# Patient Record
Sex: MALE | Race: WHITE | NOT HISPANIC OR LATINO | Employment: OTHER | URBAN - METROPOLITAN AREA
[De-identification: names, ages, dates, MRNs, and addresses within clinical notes are randomized per-mention and may not be internally consistent; named-entity substitution may affect disease eponyms.]

---

## 2017-04-17 PROBLEM — N52.9 ERECTILE DYSFUNCTION: Status: ACTIVE | Noted: 2017-04-17

## 2020-06-03 ENCOUNTER — TELEPHONE (OUTPATIENT)
Dept: FAMILY MEDICINE | Facility: CLINIC | Age: 70
End: 2020-06-03

## 2020-06-03 NOTE — TELEPHONE ENCOUNTER
----- Message from Zev Cheek sent at 6/3/2020  8:22 AM CDT -----  Contact: Pt  Pt called and would like to have someone call him back    Pt doctor is no longer with [facility] and he would like to see Dr. Goff. He would like to make sure that Dr. Goff accepts his insurance.    Pt can be reached at [phone]

## 2020-06-03 NOTE — TELEPHONE ENCOUNTER
Pt looking for a new pcp. Dr. Goff was recommended to pt, no appointments were available with dr goff in the time frame that pt needed. Scheduled pt with dr gomez to [facility].

## 2020-07-02 ENCOUNTER — PATIENT OUTREACH (OUTPATIENT)
Dept: ADMINISTRATIVE | Facility: HOSPITAL | Age: 70
End: 2020-07-02

## 2020-07-02 ENCOUNTER — LAB VISIT (OUTPATIENT)
Dept: LAB | Facility: HOSPITAL | Age: 70
End: 2020-07-02
Attending: FAMILY MEDICINE
Payer: MEDICARE

## 2020-07-02 ENCOUNTER — OFFICE VISIT (OUTPATIENT)
Dept: FAMILY MEDICINE | Facility: CLINIC | Age: 70
End: 2020-07-02
Payer: MEDICARE

## 2020-07-02 VITALS
WEIGHT: 250.25 LBS | OXYGEN SATURATION: 98 % | TEMPERATURE: 98 F | BODY MASS INDEX: 39.28 KG/M2 | SYSTOLIC BLOOD PRESSURE: 128 MMHG | HEART RATE: 83 BPM | DIASTOLIC BLOOD PRESSURE: 78 MMHG | HEIGHT: 67 IN

## 2020-07-02 DIAGNOSIS — E11.69 TYPE 2 DIABETES MELLITUS WITH OTHER SPECIFIED COMPLICATION, UNSPECIFIED WHETHER LONG TERM INSULIN USE: ICD-10-CM

## 2020-07-02 DIAGNOSIS — K21.9 GASTROESOPHAGEAL REFLUX DISEASE, ESOPHAGITIS PRESENCE NOT SPECIFIED: ICD-10-CM

## 2020-07-02 DIAGNOSIS — N52.9 ERECTILE DYSFUNCTION, UNSPECIFIED ERECTILE DYSFUNCTION TYPE: ICD-10-CM

## 2020-07-02 DIAGNOSIS — N40.0 BENIGN PROSTATIC HYPERPLASIA, UNSPECIFIED WHETHER LOWER URINARY TRACT SYMPTOMS PRESENT: ICD-10-CM

## 2020-07-02 DIAGNOSIS — N40.0 BENIGN PROSTATIC HYPERPLASIA, UNSPECIFIED WHETHER LOWER URINARY TRACT SYMPTOMS PRESENT: Primary | ICD-10-CM

## 2020-07-02 PROBLEM — N52.01 ERECTILE DYSFUNCTION DUE TO ARTERIAL INSUFFICIENCY: Status: ACTIVE | Noted: 2017-01-31

## 2020-07-02 LAB
ALBUMIN SERPL BCP-MCNC: 4 G/DL (ref 3.5–5.2)
ALP SERPL-CCNC: 83 U/L (ref 55–135)
ALT SERPL W/O P-5'-P-CCNC: 17 U/L (ref 10–44)
ANION GAP SERPL CALC-SCNC: 8 MMOL/L (ref 8–16)
AST SERPL-CCNC: 18 U/L (ref 10–40)
BASOPHILS # BLD AUTO: 0.02 K/UL (ref 0–0.2)
BASOPHILS NFR BLD: 0.3 % (ref 0–1.9)
BILIRUB SERPL-MCNC: 0.5 MG/DL (ref 0.1–1)
BUN SERPL-MCNC: 24 MG/DL (ref 8–23)
CALCIUM SERPL-MCNC: 9.2 MG/DL (ref 8.7–10.5)
CHLORIDE SERPL-SCNC: 108 MMOL/L (ref 95–110)
CHOLEST SERPL-MCNC: 140 MG/DL (ref 120–199)
CHOLEST/HDLC SERPL: 3 {RATIO} (ref 2–5)
CO2 SERPL-SCNC: 26 MMOL/L (ref 23–29)
CREAT SERPL-MCNC: 0.9 MG/DL (ref 0.5–1.4)
DIFFERENTIAL METHOD: ABNORMAL
EOSINOPHIL # BLD AUTO: 0 K/UL (ref 0–0.5)
EOSINOPHIL NFR BLD: 0.7 % (ref 0–8)
ERYTHROCYTE [DISTWIDTH] IN BLOOD BY AUTOMATED COUNT: 13.7 % (ref 11.5–14.5)
EST. GFR  (AFRICAN AMERICAN): >60 ML/MIN/1.73 M^2
EST. GFR  (NON AFRICAN AMERICAN): >60 ML/MIN/1.73 M^2
GLUCOSE SERPL-MCNC: 125 MG/DL (ref 70–110)
HCT VFR BLD AUTO: 43.2 % (ref 40–54)
HDLC SERPL-MCNC: 47 MG/DL (ref 40–75)
HDLC SERPL: 33.6 % (ref 20–50)
HGB BLD-MCNC: 14.2 G/DL (ref 14–18)
IMM GRANULOCYTES # BLD AUTO: 0.02 K/UL (ref 0–0.04)
IMM GRANULOCYTES NFR BLD AUTO: 0.3 % (ref 0–0.5)
LDLC SERPL CALC-MCNC: 75.4 MG/DL (ref 63–159)
LYMPHOCYTES # BLD AUTO: 1.6 K/UL (ref 1–4.8)
LYMPHOCYTES NFR BLD: 27.3 % (ref 18–48)
MCH RBC QN AUTO: 29.6 PG (ref 27–31)
MCHC RBC AUTO-ENTMCNC: 32.9 G/DL (ref 32–36)
MCV RBC AUTO: 90 FL (ref 82–98)
MONOCYTES # BLD AUTO: 0.5 K/UL (ref 0.3–1)
MONOCYTES NFR BLD: 9 % (ref 4–15)
NEUTROPHILS # BLD AUTO: 3.6 K/UL (ref 1.8–7.7)
NEUTROPHILS NFR BLD: 62.4 % (ref 38–73)
NONHDLC SERPL-MCNC: 93 MG/DL
NRBC BLD-RTO: 0 /100 WBC
PLATELET # BLD AUTO: 168 K/UL (ref 150–350)
PMV BLD AUTO: 9.1 FL (ref 9.2–12.9)
POTASSIUM SERPL-SCNC: 5 MMOL/L (ref 3.5–5.1)
PROSTATE SPECIFIC ANTIGEN, TOTAL: 1.5 NG/ML (ref 0–4)
PROT SERPL-MCNC: 6.7 G/DL (ref 6–8.4)
PSA FREE MFR SERPL: 46 %
PSA FREE SERPL-MCNC: 0.69 NG/ML (ref 0.01–1.5)
RBC # BLD AUTO: 4.79 M/UL (ref 4.6–6.2)
SODIUM SERPL-SCNC: 142 MMOL/L (ref 136–145)
TRIGL SERPL-MCNC: 88 MG/DL (ref 30–150)
TSH SERPL DL<=0.005 MIU/L-ACNC: 0.82 UIU/ML (ref 0.4–4)
WBC # BLD AUTO: 5.78 K/UL (ref 3.9–12.7)

## 2020-07-02 PROCEDURE — 80061 LIPID PANEL: CPT

## 2020-07-02 PROCEDURE — 99203 PR OFFICE/OUTPT VISIT, NEW, LEVL III, 30-44 MIN: ICD-10-PCS | Mod: S$PBB,,, | Performed by: FAMILY MEDICINE

## 2020-07-02 PROCEDURE — 99203 OFFICE O/P NEW LOW 30 MIN: CPT | Mod: S$PBB,,, | Performed by: FAMILY MEDICINE

## 2020-07-02 PROCEDURE — 83036 HEMOGLOBIN GLYCOSYLATED A1C: CPT

## 2020-07-02 PROCEDURE — 85025 COMPLETE CBC W/AUTO DIFF WBC: CPT

## 2020-07-02 PROCEDURE — 99999 PR PBB SHADOW E&M-EST. PATIENT-LVL IV: CPT | Mod: PBBFAC,,, | Performed by: FAMILY MEDICINE

## 2020-07-02 PROCEDURE — 99999 PR PBB SHADOW E&M-EST. PATIENT-LVL IV: ICD-10-PCS | Mod: PBBFAC,,, | Performed by: FAMILY MEDICINE

## 2020-07-02 PROCEDURE — 86803 HEPATITIS C AB TEST: CPT

## 2020-07-02 PROCEDURE — 36415 COLL VENOUS BLD VENIPUNCTURE: CPT

## 2020-07-02 PROCEDURE — 84154 ASSAY OF PSA FREE: CPT

## 2020-07-02 PROCEDURE — 99214 OFFICE O/P EST MOD 30 MIN: CPT | Mod: PBBFAC,PO | Performed by: FAMILY MEDICINE

## 2020-07-02 PROCEDURE — 80053 COMPREHEN METABOLIC PANEL: CPT

## 2020-07-02 PROCEDURE — 84443 ASSAY THYROID STIM HORMONE: CPT

## 2020-07-02 RX ORDER — AMITRIPTYLINE HYDROCHLORIDE 25 MG/1
25 TABLET, FILM COATED ORAL NIGHTLY PRN
COMMUNITY
End: 2022-06-15

## 2020-07-02 RX ORDER — ATORVASTATIN CALCIUM 10 MG/1
10 TABLET, FILM COATED ORAL
COMMUNITY
End: 2021-01-08 | Stop reason: SDUPTHER

## 2020-07-02 RX ORDER — SILDENAFIL 100 MG/1
TABLET, FILM COATED ORAL
COMMUNITY
End: 2022-06-15

## 2020-07-02 RX ORDER — CYANOCOBALAMIN (VITAMIN B-12) 500 MCG
1 TABLET ORAL
COMMUNITY

## 2020-07-02 RX ORDER — MELOXICAM 15 MG/1
15 TABLET ORAL
COMMUNITY
End: 2021-01-08 | Stop reason: SDUPTHER

## 2020-07-02 RX ORDER — PSYLLIUM HUSK 0.4 G
CAPSULE ORAL
COMMUNITY

## 2020-07-02 RX ORDER — MINERAL OIL
180 ENEMA (ML) RECTAL
COMMUNITY

## 2020-07-02 RX ORDER — HYDROGEN PEROXIDE 3 %
40 SOLUTION, NON-ORAL MISCELLANEOUS
COMMUNITY
End: 2023-09-14 | Stop reason: SDUPTHER

## 2020-07-02 RX ORDER — TRAMADOL HYDROCHLORIDE 50 MG/1
50 TABLET ORAL EVERY 6 HOURS PRN
COMMUNITY

## 2020-07-02 RX ORDER — NAPROXEN SODIUM 220 MG
220 TABLET ORAL 2 TIMES DAILY WITH MEALS
COMMUNITY
End: 2022-06-15

## 2020-07-02 RX ORDER — METFORMIN HYDROCHLORIDE 500 MG/1
TABLET ORAL
COMMUNITY
Start: 2020-06-26 | End: 2021-01-08 | Stop reason: SDUPTHER

## 2020-07-02 RX ORDER — METFORMIN HYDROCHLORIDE 500 MG/1
500 TABLET ORAL 2 TIMES DAILY WITH MEALS
COMMUNITY
End: 2021-01-08 | Stop reason: SDUPTHER

## 2020-07-02 RX ORDER — DIAPER,BRIEF,ADULT, DISPOSABLE
500 EACH MISCELLANEOUS
COMMUNITY

## 2020-07-02 RX ORDER — ALBUTEROL SULFATE 90 UG/1
2 AEROSOL, METERED RESPIRATORY (INHALATION) EVERY 6 HOURS PRN
COMMUNITY
End: 2023-05-23 | Stop reason: ALTCHOICE

## 2020-07-02 NOTE — PROGRESS NOTES
Chart review completed 2020.  Care Everywhere updates requested and reviewed.  Immunizations reconciled. Media reports reviewed.  Duplicate HM overrides and  orders removed.  Overdue HM topic chart audit and/or requested.  Overdue lab testing linked to upcoming lab appointments if applies.    HM updated with external COLONOSCOPY report.     LABS SCHEDULED.     Health Maintenance Due   Topic Date Due    PROSTATE-SPECIFIC ANTIGEN  1950    Hepatitis C Screening  1950    Shingles Vaccine (1 of 2) 2001

## 2020-07-02 NOTE — PATIENT INSTRUCTIONS
Metformin extended-release tablets  What is this medicine?  METFORMIN (met FOR min) is used to treat type 2 diabetes. It helps to control blood sugar. Treatment is combined with diet and exercise. This medicine can be used alone or with other medicines for diabetes.  How should I use this medicine?  Take this medicine by mouth with a glass of water. Take it with meals. Swallow whole, do not crush or chew. Follow the directions on the prescription label. Take your medicine at regular intervals. Do not take your medicine more often than directed.  Talk to your pediatrician regarding the use of this medicine in children. Special care may be needed.  What side effects may I notice from receiving this medicine?  Side effects that you should report to your doctor or health care professional as soon as possible:  · allergic reactions like skin rash, itching or hives, swelling of the face, lips, or tongue  · breathing problems  · feeling faint or lightheaded, falls  · muscle aches or pains  · signs and symptoms of low blood sugar such as feeling anxious, confusion, dizziness, increased hunger, unusually weak or tired, sweating, shakiness, cold, irritable, headache, blurred vision, fast heartbeat, loss of consciousness  · slow or irregular heartbeat  · unusual stomach pain or discomfort  · unusually tired or weak  Side effects that usually do not require medical attention (report to your doctor or health care professional if they continue or are bothersome):  · diarrhea  · headache  · heartburn  · metallic taste in mouth  · nausea  · stomach gas, upset  What may interact with this medicine?  Do not take this medicine with any of the following medications:  · dofetilide  · gatifloxacin  · certain contrast medicines given before X-rays, CT scans, MRI, or other procedures  This medicine may also interact with the following medications:  · acetazolamide  · certain medicines for HIV infection or hepatitis, like adefovir,  emtricitabine, entecavir, lamivudine, or tenofovir  · cimetidine  · crizotinib  · digoxin  · diuretics  · female hormones, like estrogens or progestins and birth control pills  · glycopyrrolate  · isoniazid  · lamotrigine  · medicines for blood pressure, heart disease, irregular heart beat  · memantine  · midodrine  · methazolamide  · morphine  · nicotinic acid  · phenothiazines like chlorpromazine, mesoridazine, prochlorperazine, thioridazine  · phenytoin  · procainamide  · propantheline  · quinidine  · quinine  · ranitidine  · ranolazine  · steroid medicines like prednisone or cortisone  · stimulant medicines for attention disorders, weight loss, or to stay awake  · thyroid medicines  · topiramate  · trimethoprim  · trospium  · vancomycin  · vandetanib  · zonisamide  What if I miss a dose?  If you miss a dose, take it as soon as you can. If it is almost time for your next dose, take only that dose. Do not take double or extra doses.  Where should I keep my medicine?  Keep out of the reach of children.  Store at room temperature between 15 and 30 degrees C (59 and 86 degrees F). Protect from light. Throw away any unused medicine after the expiration date.  What should I tell my health care provider before I take this medicine?  They need to know if you have any of these conditions:  · anemia  · frequently drink alcohol-containing beverages  · become easily dehydrated  · heart attack  · heart failure  · kidney disease  · liver disease  · polycystic ovary syndrome  · serious infection or injury  · vomiting  · an unusual or allergic reaction to metformin, other medicines, foods, dyes, or preservatives  · pregnant or trying to get pregnant  · breast-feeding  What should I watch for while using this medicine?  Visit your doctor or health care professional for regular checks on your progress.  A test called the HbA1C (A1C) will be monitored. This is a simple blood test. It measures your blood sugar control over the last  2 to 3 months. You will receive this test every 3 to 6 months.  Learn how to check your blood sugar. Learn the symptoms of low and high blood sugar and how to manage them.  Always carry a quick-source of sugar with you in case you have symptoms of low blood sugar. Examples include hard sugar candy or glucose tablets. Make sure others know that you can choke if you eat or drink when you develop serious symptoms of low blood sugar, such as seizures or unconsciousness. They must get medical help at once.  Tell your doctor or health care professional if you have high blood sugar. You might need to change the dose of your medicine. If you are sick or exercising more than usual, you might need to change the dose of your medicine.  Do not skip meals. Ask your doctor or health care professional if you should avoid alcohol. Many nonprescription cough and cold products contain sugar or alcohol. These can affect blood sugar.  This medicine may cause ovulation in premenopausal women who do not have regular monthly periods. This may increase your chances of becoming pregnant. You should not take this medicine if you become pregnant or think you may be pregnant. Talk with your doctor or health care professional about your birth control options while taking this medicine. Contact your doctor or health care professional right away if think you are pregnant.  The tablet shell for some brands of this medicine does not dissolve. This is normal. The tablet shell may appear whole in the stool. This is not a cause for concern.  If you are going to need surgery, a MRI, CT scan, or other procedure, tell your doctor that you are taking this medicine. You may need to stop taking this medicine before the procedure.  Wear a medical ID bracelet or chain, and carry a card that describes your disease and details of your medicine and dosage times.  NOTE:This sheet is a summary. It may not cover all possible information. If you have questions about  this medicine, talk to your doctor, pharmacist, or health care provider. Copyright© 2017 [employer]

## 2020-07-02 NOTE — PROGRESS NOTES
Subjective:       Patient ID: Cali Ho is a 70 y.o. male.    Chief Complaint: Establish Care    Patient here today to establish care.  He lives in [place], [place] and his previous PCP was in [facility].  Past medical history includes hypertension, erectile dysfunction, GERD, and diabetes.  He has no complaints or concerns today.  He had a colonoscopy done this year in [facility].  He also believes he has had the pneumonia vaccine although he is not sure when.    Review of Systems   Constitutional: Negative for activity change, appetite change, chills and fever.   HENT: Negative for nasal congestion, ear discharge, ear pain and sinus pressure/congestion.    Eyes: Negative for pain and itching.   Respiratory: Negative for chest tightness and shortness of breath.    Cardiovascular: Negative for chest pain and palpitations.   Gastrointestinal: Negative for abdominal pain, constipation, nausea and vomiting.   Endocrine: Negative for cold intolerance and heat intolerance.   Musculoskeletal: Negative for arthralgias, joint swelling and myalgias.   Integumentary:  Negative for color change and rash.   Neurological: Negative for dizziness, weakness and headaches.   Psychiatric/Behavioral: Negative for agitation, behavioral problems and confusion.         Objective:      Physical Exam  Vitals signs and nursing note reviewed.   Constitutional:       Appearance: He is well-developed.   HENT:      Head: Normocephalic and atraumatic.   Eyes:      Pupils: Pupils are equal, round, and reactive to light.   Neck:      Musculoskeletal: Normal range of motion and neck supple.   Cardiovascular:      Rate and Rhythm: Normal rate and regular rhythm.      Heart sounds: No murmur.   Pulmonary:      Effort: Pulmonary effort is normal. No respiratory distress.      Breath sounds: Normal breath sounds. No wheezing or rales.   Abdominal:      General: There is no distension.      Palpations: Abdomen is soft.      Tenderness: There  is no abdominal tenderness. There is no guarding.   Musculoskeletal: Normal range of motion.   Skin:     General: Skin is warm and dry.   Neurological:      Mental Status: He is alert and oriented to person, place, and time.      Deep Tendon Reflexes: Reflexes normal.   Psychiatric:         Behavior: Behavior normal.         Thought Content: Thought content normal.         Judgment: Judgment normal.         Assessment:       1. Benign prostatic hyperplasia, unspecified whether lower urinary tract symptoms present    2. Erectile dysfunction, unspecified erectile dysfunction type    3. Type 2 diabetes mellitus with other specified complication, unspecified whether long term insulin use    4. Gastroesophageal reflux disease, esophagitis presence not specified        Plan:       Orders Placed This Encounter   Procedures    CBC auto differential     Standing Status:   Future     Standing Expiration Date:   8/2/2020    Comprehensive metabolic panel     Standing Status:   Future     Standing Expiration Date:   8/2/2020    Hemoglobin A1C     Standing Status:   Future     Standing Expiration Date:   8/2/2020    Lipid Panel     Standing Status:   Future     Standing Expiration Date:   8/2/2020    PSA, total and free     Standing Status:   Future     Standing Expiration Date:   8/2/2020    TSH     Standing Status:   Future     Standing Expiration Date:   8/2/2020    Hepatitis C Antibody     Standing Status:   Future     Standing Expiration Date:   9/2/2021     -order fasting labs as above  -will need old records from previous providers to assess health maintenance needs.

## 2020-07-03 LAB
ESTIMATED AVG GLUCOSE: 128 MG/DL (ref 68–131)
HBA1C MFR BLD HPLC: 6.1 % (ref 4–5.6)
HCV AB SERPL QL IA: NEGATIVE

## 2021-01-08 ENCOUNTER — OFFICE VISIT (OUTPATIENT)
Dept: FAMILY MEDICINE | Facility: CLINIC | Age: 71
End: 2021-01-08
Payer: MEDICARE

## 2021-01-08 ENCOUNTER — LAB VISIT (OUTPATIENT)
Dept: LAB | Facility: HOSPITAL | Age: 71
End: 2021-01-08
Attending: NURSE PRACTITIONER
Payer: MEDICARE

## 2021-01-08 VITALS
HEART RATE: 84 BPM | TEMPERATURE: 98 F | BODY MASS INDEX: 39.48 KG/M2 | DIASTOLIC BLOOD PRESSURE: 86 MMHG | WEIGHT: 251.56 LBS | SYSTOLIC BLOOD PRESSURE: 142 MMHG | HEIGHT: 67 IN

## 2021-01-08 DIAGNOSIS — E11.69 TYPE 2 DIABETES MELLITUS WITH OTHER SPECIFIED COMPLICATION, UNSPECIFIED WHETHER LONG TERM INSULIN USE: ICD-10-CM

## 2021-01-08 DIAGNOSIS — N40.0 BENIGN PROSTATIC HYPERPLASIA, UNSPECIFIED WHETHER LOWER URINARY TRACT SYMPTOMS PRESENT: ICD-10-CM

## 2021-01-08 DIAGNOSIS — E11.69 TYPE 2 DIABETES MELLITUS WITH OTHER SPECIFIED COMPLICATION, UNSPECIFIED WHETHER LONG TERM INSULIN USE: Primary | ICD-10-CM

## 2021-01-08 LAB
ALBUMIN SERPL BCP-MCNC: 3.9 G/DL (ref 3.5–5.2)
ALP SERPL-CCNC: 84 U/L (ref 55–135)
ALT SERPL W/O P-5'-P-CCNC: 20 U/L (ref 10–44)
ANION GAP SERPL CALC-SCNC: 9 MMOL/L (ref 8–16)
AST SERPL-CCNC: 24 U/L (ref 10–40)
BILIRUB SERPL-MCNC: 0.6 MG/DL (ref 0.1–1)
BUN SERPL-MCNC: 19 MG/DL (ref 8–23)
CALCIUM SERPL-MCNC: 8.8 MG/DL (ref 8.7–10.5)
CHLORIDE SERPL-SCNC: 107 MMOL/L (ref 95–110)
CO2 SERPL-SCNC: 23 MMOL/L (ref 23–29)
CREAT SERPL-MCNC: 0.9 MG/DL (ref 0.5–1.4)
EST. GFR  (AFRICAN AMERICAN): >60 ML/MIN/1.73 M^2
EST. GFR  (NON AFRICAN AMERICAN): >60 ML/MIN/1.73 M^2
ESTIMATED AVG GLUCOSE: 134 MG/DL (ref 68–131)
GLUCOSE SERPL-MCNC: 116 MG/DL (ref 70–110)
HBA1C MFR BLD HPLC: 6.3 % (ref 4–5.6)
POTASSIUM SERPL-SCNC: 4.4 MMOL/L (ref 3.5–5.1)
PROT SERPL-MCNC: 6.7 G/DL (ref 6–8.4)
SODIUM SERPL-SCNC: 139 MMOL/L (ref 136–145)

## 2021-01-08 PROCEDURE — 36415 COLL VENOUS BLD VENIPUNCTURE: CPT | Mod: PO

## 2021-01-08 PROCEDURE — 99999 PR PBB SHADOW E&M-EST. PATIENT-LVL IV: ICD-10-PCS | Mod: PBBFAC,,, | Performed by: NURSE PRACTITIONER

## 2021-01-08 PROCEDURE — 99214 PR OFFICE/OUTPT VISIT, EST, LEVL IV, 30-39 MIN: ICD-10-PCS | Mod: S$PBB,,, | Performed by: NURSE PRACTITIONER

## 2021-01-08 PROCEDURE — 99214 OFFICE O/P EST MOD 30 MIN: CPT | Mod: PBBFAC,PO | Performed by: NURSE PRACTITIONER

## 2021-01-08 PROCEDURE — 99999 PR PBB SHADOW E&M-EST. PATIENT-LVL IV: CPT | Mod: PBBFAC,,, | Performed by: NURSE PRACTITIONER

## 2021-01-08 PROCEDURE — 99214 OFFICE O/P EST MOD 30 MIN: CPT | Mod: S$PBB,,, | Performed by: NURSE PRACTITIONER

## 2021-01-08 PROCEDURE — 80053 COMPREHEN METABOLIC PANEL: CPT

## 2021-01-08 PROCEDURE — 83036 HEMOGLOBIN GLYCOSYLATED A1C: CPT

## 2021-01-08 RX ORDER — ATORVASTATIN CALCIUM 10 MG/1
10 TABLET, FILM COATED ORAL NIGHTLY
Qty: 90 TABLET | Refills: 1 | Status: SHIPPED | OUTPATIENT
Start: 2021-01-08 | End: 2021-06-29

## 2021-01-08 RX ORDER — MELOXICAM 15 MG/1
15 TABLET ORAL DAILY PRN
Qty: 90 TABLET | Refills: 1 | Status: SHIPPED | OUTPATIENT
Start: 2021-01-08 | End: 2021-06-29

## 2021-01-08 RX ORDER — METFORMIN HYDROCHLORIDE 500 MG/1
500 TABLET ORAL 2 TIMES DAILY WITH MEALS
Qty: 180 TABLET | Refills: 1 | Status: SHIPPED | OUTPATIENT
Start: 2021-01-08 | End: 2021-06-29

## 2021-01-14 DIAGNOSIS — E11.9 TYPE 2 DIABETES MELLITUS WITHOUT COMPLICATION: ICD-10-CM

## 2021-04-05 ENCOUNTER — PATIENT MESSAGE (OUTPATIENT)
Dept: ADMINISTRATIVE | Facility: HOSPITAL | Age: 71
End: 2021-04-05

## 2021-06-10 ENCOUNTER — PATIENT OUTREACH (OUTPATIENT)
Dept: ADMINISTRATIVE | Facility: HOSPITAL | Age: 71
End: 2021-06-10

## 2021-06-10 DIAGNOSIS — E11.9 DIABETES MELLITUS WITHOUT COMPLICATION: Primary | ICD-10-CM

## 2021-06-11 ENCOUNTER — TELEPHONE (OUTPATIENT)
Dept: FAMILY MEDICINE | Facility: CLINIC | Age: 71
End: 2021-06-11

## 2021-06-29 RX ORDER — METFORMIN HYDROCHLORIDE 500 MG/1
TABLET ORAL
Qty: 180 TABLET | Refills: 1 | Status: SHIPPED | OUTPATIENT
Start: 2021-06-29 | End: 2022-01-25 | Stop reason: SDUPTHER

## 2021-06-29 RX ORDER — ATORVASTATIN CALCIUM 10 MG/1
TABLET, FILM COATED ORAL
Qty: 90 TABLET | Refills: 1 | Status: SHIPPED | OUTPATIENT
Start: 2021-06-29 | End: 2022-01-25 | Stop reason: SDUPTHER

## 2021-06-29 RX ORDER — MELOXICAM 15 MG/1
TABLET ORAL
Qty: 90 TABLET | Refills: 1 | Status: SHIPPED | OUTPATIENT
Start: 2021-06-29 | End: 2022-01-25 | Stop reason: SDUPTHER

## 2021-07-12 ENCOUNTER — LAB VISIT (OUTPATIENT)
Dept: LAB | Facility: HOSPITAL | Age: 71
End: 2021-07-12
Payer: MEDICARE

## 2021-07-12 ENCOUNTER — CLINICAL SUPPORT (OUTPATIENT)
Dept: FAMILY MEDICINE | Facility: CLINIC | Age: 71
End: 2021-07-12
Attending: FAMILY MEDICINE
Payer: MEDICARE

## 2021-07-12 ENCOUNTER — OFFICE VISIT (OUTPATIENT)
Dept: FAMILY MEDICINE | Facility: CLINIC | Age: 71
End: 2021-07-12
Payer: MEDICARE

## 2021-07-12 VITALS
BODY MASS INDEX: 39.62 KG/M2 | DIASTOLIC BLOOD PRESSURE: 76 MMHG | OXYGEN SATURATION: 95 % | HEIGHT: 67 IN | HEART RATE: 80 BPM | SYSTOLIC BLOOD PRESSURE: 126 MMHG | WEIGHT: 252.44 LBS

## 2021-07-12 DIAGNOSIS — E11.69 TYPE 2 DIABETES MELLITUS WITH OTHER SPECIFIED COMPLICATION, UNSPECIFIED WHETHER LONG TERM INSULIN USE: ICD-10-CM

## 2021-07-12 DIAGNOSIS — N52.01 ERECTILE DYSFUNCTION DUE TO ARTERIAL INSUFFICIENCY: ICD-10-CM

## 2021-07-12 DIAGNOSIS — E11.69 TYPE 2 DIABETES MELLITUS WITH OTHER SPECIFIED COMPLICATION, UNSPECIFIED WHETHER LONG TERM INSULIN USE: Primary | ICD-10-CM

## 2021-07-12 DIAGNOSIS — N40.0 BENIGN PROSTATIC HYPERPLASIA, UNSPECIFIED WHETHER LOWER URINARY TRACT SYMPTOMS PRESENT: ICD-10-CM

## 2021-07-12 DIAGNOSIS — E11.9 DIABETES MELLITUS WITHOUT COMPLICATION: ICD-10-CM

## 2021-07-12 LAB
ALBUMIN SERPL BCP-MCNC: 3.9 G/DL (ref 3.5–5.2)
ALP SERPL-CCNC: 83 U/L (ref 55–135)
ALT SERPL W/O P-5'-P-CCNC: 18 U/L (ref 10–44)
ANION GAP SERPL CALC-SCNC: 8 MMOL/L (ref 8–16)
AST SERPL-CCNC: 22 U/L (ref 10–40)
BASOPHILS # BLD AUTO: 0.01 K/UL (ref 0–0.2)
BASOPHILS NFR BLD: 0.2 % (ref 0–1.9)
BILIRUB SERPL-MCNC: 0.7 MG/DL (ref 0.1–1)
BUN SERPL-MCNC: 15 MG/DL (ref 8–23)
CALCIUM SERPL-MCNC: 9.1 MG/DL (ref 8.7–10.5)
CHLORIDE SERPL-SCNC: 108 MMOL/L (ref 95–110)
CHOLEST SERPL-MCNC: 152 MG/DL (ref 120–199)
CHOLEST/HDLC SERPL: 3 {RATIO} (ref 2–5)
CO2 SERPL-SCNC: 22 MMOL/L (ref 23–29)
CREAT SERPL-MCNC: 0.9 MG/DL (ref 0.5–1.4)
DIFFERENTIAL METHOD: ABNORMAL
EOSINOPHIL # BLD AUTO: 0.1 K/UL (ref 0–0.5)
EOSINOPHIL NFR BLD: 0.8 % (ref 0–8)
ERYTHROCYTE [DISTWIDTH] IN BLOOD BY AUTOMATED COUNT: 14 % (ref 11.5–14.5)
EST. GFR  (AFRICAN AMERICAN): >60 ML/MIN/1.73 M^2
EST. GFR  (NON AFRICAN AMERICAN): >60 ML/MIN/1.73 M^2
ESTIMATED AVG GLUCOSE: 134 MG/DL (ref 68–131)
GLUCOSE SERPL-MCNC: 133 MG/DL (ref 70–110)
HBA1C MFR BLD: 6.3 % (ref 4–5.6)
HCT VFR BLD AUTO: 41.9 % (ref 40–54)
HDLC SERPL-MCNC: 50 MG/DL (ref 40–75)
HDLC SERPL: 32.9 % (ref 20–50)
HGB BLD-MCNC: 14.3 G/DL (ref 14–18)
IMM GRANULOCYTES # BLD AUTO: 0.02 K/UL (ref 0–0.04)
IMM GRANULOCYTES NFR BLD AUTO: 0.3 % (ref 0–0.5)
LDLC SERPL CALC-MCNC: 69 MG/DL (ref 63–159)
LYMPHOCYTES # BLD AUTO: 1.6 K/UL (ref 1–4.8)
LYMPHOCYTES NFR BLD: 26.9 % (ref 18–48)
MCH RBC QN AUTO: 30.2 PG (ref 27–31)
MCHC RBC AUTO-ENTMCNC: 34.1 G/DL (ref 32–36)
MCV RBC AUTO: 88 FL (ref 82–98)
MONOCYTES # BLD AUTO: 0.5 K/UL (ref 0.3–1)
MONOCYTES NFR BLD: 8.9 % (ref 4–15)
NEUTROPHILS # BLD AUTO: 3.8 K/UL (ref 1.8–7.7)
NEUTROPHILS NFR BLD: 62.9 % (ref 38–73)
NONHDLC SERPL-MCNC: 102 MG/DL
NRBC BLD-RTO: 0 /100 WBC
PLATELET # BLD AUTO: 148 K/UL (ref 150–450)
PMV BLD AUTO: 9.9 FL (ref 9.2–12.9)
POTASSIUM SERPL-SCNC: 4.3 MMOL/L (ref 3.5–5.1)
PROT SERPL-MCNC: 6.5 G/DL (ref 6–8.4)
RBC # BLD AUTO: 4.74 M/UL (ref 4.6–6.2)
SODIUM SERPL-SCNC: 138 MMOL/L (ref 136–145)
TRIGL SERPL-MCNC: 165 MG/DL (ref 30–150)
WBC # BLD AUTO: 6.1 K/UL (ref 3.9–12.7)

## 2021-07-12 PROCEDURE — 36415 COLL VENOUS BLD VENIPUNCTURE: CPT | Mod: PO | Performed by: FAMILY MEDICINE

## 2021-07-12 PROCEDURE — 85025 COMPLETE CBC W/AUTO DIFF WBC: CPT | Performed by: FAMILY MEDICINE

## 2021-07-12 PROCEDURE — 99999 PR PBB SHADOW E&M-EST. PATIENT-LVL V: CPT | Mod: PBBFAC,,, | Performed by: FAMILY MEDICINE

## 2021-07-12 PROCEDURE — 92228 IMG RTA DETC/MNTR DS PHY/QHP: CPT | Mod: PBBFAC,PO

## 2021-07-12 PROCEDURE — 83036 HEMOGLOBIN GLYCOSYLATED A1C: CPT | Performed by: FAMILY MEDICINE

## 2021-07-12 PROCEDURE — 92228 DIABETIC EYE SCREENING PHOTO: ICD-10-PCS | Mod: 26,S$PBB,, | Performed by: OPTOMETRIST

## 2021-07-12 PROCEDURE — 84154 ASSAY OF PSA FREE: CPT | Performed by: FAMILY MEDICINE

## 2021-07-12 PROCEDURE — 99215 OFFICE O/P EST HI 40 MIN: CPT | Mod: PBBFAC,PO | Performed by: FAMILY MEDICINE

## 2021-07-12 PROCEDURE — 84153 ASSAY OF PSA TOTAL: CPT | Performed by: FAMILY MEDICINE

## 2021-07-12 PROCEDURE — 80061 LIPID PANEL: CPT | Performed by: FAMILY MEDICINE

## 2021-07-12 PROCEDURE — 99213 OFFICE O/P EST LOW 20 MIN: CPT | Mod: S$PBB,,, | Performed by: FAMILY MEDICINE

## 2021-07-12 PROCEDURE — 80053 COMPREHEN METABOLIC PANEL: CPT | Performed by: FAMILY MEDICINE

## 2021-07-12 PROCEDURE — 92228 IMG RTA DETC/MNTR DS PHY/QHP: CPT | Mod: 26,S$PBB,, | Performed by: OPTOMETRIST

## 2021-07-12 PROCEDURE — 90471 IMMUNIZATION ADMIN: CPT | Mod: PBBFAC,PO

## 2021-07-12 PROCEDURE — 99999 PR PBB SHADOW E&M-EST. PATIENT-LVL V: ICD-10-PCS | Mod: PBBFAC,,, | Performed by: FAMILY MEDICINE

## 2021-07-12 PROCEDURE — 99213 PR OFFICE/OUTPT VISIT, EST, LEVL III, 20-29 MIN: ICD-10-PCS | Mod: S$PBB,,, | Performed by: FAMILY MEDICINE

## 2021-07-13 LAB
PROSTATE SPECIFIC ANTIGEN, TOTAL: 3.2 NG/ML (ref 0–4)
PSA FREE MFR SERPL: 26.88 %
PSA FREE SERPL-MCNC: 0.86 NG/ML (ref 0.01–1.5)

## 2021-07-14 ENCOUNTER — TELEPHONE (OUTPATIENT)
Dept: FAMILY MEDICINE | Facility: CLINIC | Age: 71
End: 2021-07-14

## 2021-07-20 ENCOUNTER — TELEPHONE (OUTPATIENT)
Dept: FAMILY MEDICINE | Facility: CLINIC | Age: 71
End: 2021-07-20

## 2021-09-10 ENCOUNTER — TELEPHONE (OUTPATIENT)
Dept: PULMONOLOGY | Facility: CLINIC | Age: 71
End: 2021-09-10

## 2021-09-16 ENCOUNTER — TELEPHONE (OUTPATIENT)
Dept: PULMONOLOGY | Facility: CLINIC | Age: 71
End: 2021-09-16

## 2021-10-07 ENCOUNTER — PATIENT MESSAGE (OUTPATIENT)
Dept: ADMINISTRATIVE | Facility: HOSPITAL | Age: 71
End: 2021-10-07

## 2022-01-25 RX ORDER — ATORVASTATIN CALCIUM 10 MG/1
10 TABLET, FILM COATED ORAL NIGHTLY
Qty: 90 TABLET | Refills: 1 | Status: SHIPPED | OUTPATIENT
Start: 2022-01-25 | End: 2022-06-28 | Stop reason: SDUPTHER

## 2022-01-25 RX ORDER — METFORMIN HYDROCHLORIDE 500 MG/1
500 TABLET ORAL 2 TIMES DAILY WITH MEALS
Qty: 180 TABLET | Refills: 1 | Status: SHIPPED | OUTPATIENT
Start: 2022-01-25 | End: 2022-02-04

## 2022-01-25 RX ORDER — MELOXICAM 15 MG/1
15 TABLET ORAL DAILY PRN
Qty: 90 TABLET | Refills: 1 | Status: SHIPPED | OUTPATIENT
Start: 2022-01-25 | End: 2022-06-28 | Stop reason: SDUPTHER

## 2022-01-25 NOTE — TELEPHONE ENCOUNTER
Care Due:                  Date            Visit Type   Department     Provider  --------------------------------------------------------------------------------                                             [facility]  Last Visit: 07-      None         KATYA Romeo                                           [facility]  Next Visit: 02-      None         KATYA Romeo                                                            Last  Test          Frequency    Reason                     Performed    Due Date  --------------------------------------------------------------------------------    HBA1C.......  6 months...  metFORMIN................  07- 01-    Powered by [employer]. Reference number: [id number].   1/25/2022 10:32:27 AM CST

## 2022-01-25 NOTE — TELEPHONE ENCOUNTER
----- Message from Eran Wilkerson sent at 1/25/2022 10:14 AM CST -----  Contact: Pt self [phone]  .Type:  RX Refill Request    Who Called: Pt self     Refill or New Rx:Refill    RX Name and Strength:meloxicam (MOBIC) 15 MG tablet, metFORMIN (GLUCOPHAGE) 500 MG tablet, and atorvastatin (LIPITOR) 10 MG tablet    How is the patient currently taking it? (ex. 1XDay):    Is this a 30 day or 90 day RX:    Preferred Pharmacy with phone number:  [facility]  [address] [place] [address], [place], [address]  Phone: [phone] Fax: [phone]     Local or Mail Order:Local    Ordering Provider:    Would the patient rather a call back or a response via [facility]? Call back     Best Call Back Number:[phone]    Additional Information:

## 2022-01-26 DIAGNOSIS — E11.9 TYPE 2 DIABETES MELLITUS WITHOUT COMPLICATION: ICD-10-CM

## 2022-02-01 ENCOUNTER — OFFICE VISIT (OUTPATIENT)
Dept: FAMILY MEDICINE | Facility: CLINIC | Age: 72
End: 2022-02-01
Payer: MEDICARE

## 2022-02-01 ENCOUNTER — LAB VISIT (OUTPATIENT)
Dept: LAB | Facility: HOSPITAL | Age: 72
End: 2022-02-01
Attending: FAMILY MEDICINE
Payer: MEDICARE

## 2022-02-01 VITALS
BODY MASS INDEX: 40 KG/M2 | DIASTOLIC BLOOD PRESSURE: 84 MMHG | HEIGHT: 67 IN | WEIGHT: 254.88 LBS | OXYGEN SATURATION: 96 % | SYSTOLIC BLOOD PRESSURE: 138 MMHG | HEART RATE: 86 BPM

## 2022-02-01 DIAGNOSIS — E11.69 TYPE 2 DIABETES MELLITUS WITH OTHER SPECIFIED COMPLICATION, UNSPECIFIED WHETHER LONG TERM INSULIN USE: Primary | ICD-10-CM

## 2022-02-01 DIAGNOSIS — N40.0 BENIGN PROSTATIC HYPERPLASIA, UNSPECIFIED WHETHER LOWER URINARY TRACT SYMPTOMS PRESENT: ICD-10-CM

## 2022-02-01 DIAGNOSIS — M54.42 CHRONIC BILATERAL LOW BACK PAIN WITH BILATERAL SCIATICA: ICD-10-CM

## 2022-02-01 DIAGNOSIS — M54.41 CHRONIC BILATERAL LOW BACK PAIN WITH BILATERAL SCIATICA: ICD-10-CM

## 2022-02-01 DIAGNOSIS — G89.29 CHRONIC BILATERAL LOW BACK PAIN WITH BILATERAL SCIATICA: ICD-10-CM

## 2022-02-01 DIAGNOSIS — E11.69 TYPE 2 DIABETES MELLITUS WITH OTHER SPECIFIED COMPLICATION, UNSPECIFIED WHETHER LONG TERM INSULIN USE: ICD-10-CM

## 2022-02-01 PROCEDURE — 99999 PR PBB SHADOW E&M-EST. PATIENT-LVL V: ICD-10-PCS | Mod: PBBFAC,,, | Performed by: FAMILY MEDICINE

## 2022-02-01 PROCEDURE — 99999 PR PBB SHADOW E&M-EST. PATIENT-LVL V: CPT | Mod: PBBFAC,,, | Performed by: FAMILY MEDICINE

## 2022-02-01 PROCEDURE — 85025 COMPLETE CBC W/AUTO DIFF WBC: CPT | Performed by: FAMILY MEDICINE

## 2022-02-01 PROCEDURE — 99213 OFFICE O/P EST LOW 20 MIN: CPT | Mod: S$PBB,,, | Performed by: FAMILY MEDICINE

## 2022-02-01 PROCEDURE — 80053 COMPREHEN METABOLIC PANEL: CPT | Performed by: FAMILY MEDICINE

## 2022-02-01 PROCEDURE — 99213 PR OFFICE/OUTPT VISIT, EST, LEVL III, 20-29 MIN: ICD-10-PCS | Mod: S$PBB,,, | Performed by: FAMILY MEDICINE

## 2022-02-01 PROCEDURE — 83036 HEMOGLOBIN GLYCOSYLATED A1C: CPT | Performed by: FAMILY MEDICINE

## 2022-02-01 PROCEDURE — 36415 COLL VENOUS BLD VENIPUNCTURE: CPT | Mod: PO | Performed by: FAMILY MEDICINE

## 2022-02-01 PROCEDURE — 99215 OFFICE O/P EST HI 40 MIN: CPT | Mod: PBBFAC,PO | Performed by: FAMILY MEDICINE

## 2022-02-01 NOTE — PROGRESS NOTES
Subjective:       Patient ID: Cali Ho is a 71 y.o. male.    Chief Complaint: Follow-up    Here for regular followup.     Wife recently gave birth to son, Fortunato. He is 7 weeks premature and still in NICU. This is causing him some understandable life stress.     Brings up concern of low back pain with sciatica. This is a chronic issue. He is not overly concerned about it but does not want it to get worse.     Review of Systems   Constitutional: Negative for activity change, appetite change, chills and fever.   HENT: Negative for nasal congestion, ear discharge, ear pain and sinus pressure/congestion.    Eyes: Negative for pain and itching.   Respiratory: Negative for chest tightness and shortness of breath.    Cardiovascular: Negative for chest pain and palpitations.   Gastrointestinal: Negative for abdominal pain, constipation, nausea and vomiting.   Endocrine: Negative for cold intolerance and heat intolerance.   Musculoskeletal: Negative for arthralgias, joint swelling and myalgias.   Integumentary:  Negative for color change and rash.   Neurological: Negative for dizziness, weakness and headaches.   Psychiatric/Behavioral: Negative for agitation, behavioral problems and confusion.         Objective:      Physical Exam  Vitals and nursing note reviewed.   Constitutional:       Appearance: He is well-developed and well-nourished.   HENT:      Head: Normocephalic and atraumatic.   Eyes:      Extraocular Movements: EOM normal.      Pupils: Pupils are equal, round, and reactive to light.   Cardiovascular:      Rate and Rhythm: Normal rate and regular rhythm.      Heart sounds: No murmur heard.      Pulmonary:      Effort: Pulmonary effort is normal. No respiratory distress.      Breath sounds: Normal breath sounds. No wheezing or rales.   Abdominal:      General: There is no distension.      Palpations: Abdomen is soft.      Tenderness: There is no abdominal tenderness. There is no guarding.   Musculoskeletal:       Cervical back: Normal range of motion and neck supple.      Lumbar back: Pain, spasms and tenderness present. No swelling, edema or bony tenderness. Decreased range of motion.   Skin:     General: Skin is warm and dry.   Neurological:      Mental Status: He is alert and oriented to person, place, and time.      Deep Tendon Reflexes: Reflexes normal.   Psychiatric:         Mood and Affect: Mood and affect normal.         Behavior: Behavior normal.         Thought Content: Thought content normal.         Judgment: Judgment normal.         Assessment:       Problem List Items Addressed This Visit        Renal/    BPH (benign prostatic hyperplasia)    Relevant Orders    CBC Auto Differential    Hemoglobin A1C    Comprehensive Metabolic Panel    MICROALBUMIN / CREATININE RATIO URINE       Endocrine    Type 2 diabetes mellitus with other specified complication - Primary    Relevant Orders    CBC Auto Differential    Hemoglobin A1C    Comprehensive Metabolic Panel    MICROALBUMIN / CREATININE RATIO URINE       Orthopedic    Chronic bilateral low back pain with bilateral sciatica    Relevant Orders    Ambulatory referral/consult to [facility]          Plan:       Cali was seen today for follow-up.    Diagnoses and all orders for this visit:    Type 2 diabetes mellitus with other specified complication, unspecified whether long term insulin use  -     CBC Auto Differential; Future  -     Hemoglobin A1C; Future  -     Comprehensive Metabolic Panel; Future  -     MICROALBUMIN / CREATININE RATIO URINE    Benign prostatic hyperplasia, unspecified whether lower urinary tract symptoms present  -     CBC Auto Differential; Future  -     Hemoglobin A1C; Future  -     Comprehensive Metabolic Panel; Future  -     MICROALBUMIN / CREATININE RATIO URINE    Chronic bilateral low back pain with bilateral sciatica  -     Ambulatory referral/consult to [facility]; Future      Recheck labs today with A1C and urine  studies.     Referral to back and spine clinic.

## 2022-02-02 LAB
ALBUMIN SERPL BCP-MCNC: 4.1 G/DL (ref 3.5–5.2)
ALP SERPL-CCNC: 86 U/L (ref 55–135)
ALT SERPL W/O P-5'-P-CCNC: 17 U/L (ref 10–44)
ANION GAP SERPL CALC-SCNC: 8 MMOL/L (ref 8–16)
AST SERPL-CCNC: 17 U/L (ref 10–40)
BASOPHILS # BLD AUTO: 0.02 K/UL (ref 0–0.2)
BASOPHILS NFR BLD: 0.3 % (ref 0–1.9)
BILIRUB SERPL-MCNC: 0.6 MG/DL (ref 0.1–1)
BUN SERPL-MCNC: 15 MG/DL (ref 8–23)
CALCIUM SERPL-MCNC: 9.3 MG/DL (ref 8.7–10.5)
CHLORIDE SERPL-SCNC: 109 MMOL/L (ref 95–110)
CO2 SERPL-SCNC: 21 MMOL/L (ref 23–29)
CREAT SERPL-MCNC: 0.7 MG/DL (ref 0.5–1.4)
DIFFERENTIAL METHOD: NORMAL
EOSINOPHIL # BLD AUTO: 0.1 K/UL (ref 0–0.5)
EOSINOPHIL NFR BLD: 0.9 % (ref 0–8)
ERYTHROCYTE [DISTWIDTH] IN BLOOD BY AUTOMATED COUNT: 13.4 % (ref 11.5–14.5)
EST. GFR  (AFRICAN AMERICAN): >60 ML/MIN/1.73 M^2
EST. GFR  (NON AFRICAN AMERICAN): >60 ML/MIN/1.73 M^2
ESTIMATED AVG GLUCOSE: 148 MG/DL (ref 68–131)
GLUCOSE SERPL-MCNC: 106 MG/DL (ref 70–110)
HBA1C MFR BLD: 6.8 % (ref 4–5.6)
HCT VFR BLD AUTO: 43.1 % (ref 40–54)
HGB BLD-MCNC: 14.6 G/DL (ref 14–18)
IMM GRANULOCYTES # BLD AUTO: 0.02 K/UL (ref 0–0.04)
IMM GRANULOCYTES NFR BLD AUTO: 0.3 % (ref 0–0.5)
LYMPHOCYTES # BLD AUTO: 2.2 K/UL (ref 1–4.8)
LYMPHOCYTES NFR BLD: 31.3 % (ref 18–48)
MCH RBC QN AUTO: 30.5 PG (ref 27–31)
MCHC RBC AUTO-ENTMCNC: 33.9 G/DL (ref 32–36)
MCV RBC AUTO: 90 FL (ref 82–98)
MONOCYTES # BLD AUTO: 0.5 K/UL (ref 0.3–1)
MONOCYTES NFR BLD: 7.7 % (ref 4–15)
NEUTROPHILS # BLD AUTO: 4.1 K/UL (ref 1.8–7.7)
NEUTROPHILS NFR BLD: 59.5 % (ref 38–73)
NRBC BLD-RTO: 0 /100 WBC
PLATELET # BLD AUTO: 198 K/UL (ref 150–450)
PMV BLD AUTO: 10.4 FL (ref 9.2–12.9)
POTASSIUM SERPL-SCNC: 4 MMOL/L (ref 3.5–5.1)
PROT SERPL-MCNC: 6.7 G/DL (ref 6–8.4)
RBC # BLD AUTO: 4.79 M/UL (ref 4.6–6.2)
SODIUM SERPL-SCNC: 138 MMOL/L (ref 136–145)
WBC # BLD AUTO: 6.89 K/UL (ref 3.9–12.7)

## 2022-02-04 ENCOUNTER — TELEPHONE (OUTPATIENT)
Dept: FAMILY MEDICINE | Facility: CLINIC | Age: 72
End: 2022-02-04
Payer: MEDICARE

## 2022-02-04 RX ORDER — METFORMIN HYDROCHLORIDE 500 MG/1
1000 TABLET ORAL 2 TIMES DAILY WITH MEALS
Qty: 180 TABLET | Refills: 1 | Status: SHIPPED | OUTPATIENT
Start: 2022-02-04 | End: 2022-02-04

## 2022-02-04 RX ORDER — METFORMIN HYDROCHLORIDE 500 MG/1
1000 TABLET ORAL 2 TIMES DAILY WITH MEALS
Qty: 180 TABLET | Refills: 1 | Status: SHIPPED | OUTPATIENT
Start: 2022-02-04 | End: 2022-06-09

## 2022-02-04 NOTE — TELEPHONE ENCOUNTER
----- Message from Jakob Romeo MD sent at 2/4/2022  1:44 PM CST -----  A1C has gone up, now out of pre-diabetic levels and in diabetic levels. I'm going to increase your dose of metformin to 1000mg twice a day.

## 2022-03-16 ENCOUNTER — TELEPHONE (OUTPATIENT)
Dept: SPINE | Facility: CLINIC | Age: 72
End: 2022-03-16
Payer: MEDICARE

## 2022-03-16 DIAGNOSIS — E11.9 TYPE 2 DIABETES MELLITUS WITHOUT COMPLICATION: ICD-10-CM

## 2022-03-21 ENCOUNTER — PATIENT MESSAGE (OUTPATIENT)
Dept: ADMINISTRATIVE | Facility: HOSPITAL | Age: 72
End: 2022-03-21
Payer: MEDICARE

## 2022-06-09 ENCOUNTER — TELEPHONE (OUTPATIENT)
Dept: FAMILY MEDICINE | Facility: CLINIC | Age: 72
End: 2022-06-09
Payer: MEDICARE

## 2022-06-09 ENCOUNTER — PATIENT MESSAGE (OUTPATIENT)
Dept: FAMILY MEDICINE | Facility: CLINIC | Age: 72
End: 2022-06-09
Payer: MEDICARE

## 2022-06-15 ENCOUNTER — LAB VISIT (OUTPATIENT)
Dept: LAB | Facility: CLINIC | Age: 72
End: 2022-06-15
Payer: MEDICARE

## 2022-06-15 ENCOUNTER — OFFICE VISIT (OUTPATIENT)
Dept: FAMILY MEDICINE | Facility: CLINIC | Age: 72
End: 2022-06-15
Payer: MEDICARE

## 2022-06-15 VITALS
TEMPERATURE: 98 F | HEART RATE: 82 BPM | WEIGHT: 253 LBS | HEIGHT: 67 IN | OXYGEN SATURATION: 93 % | RESPIRATION RATE: 16 BRPM | BODY MASS INDEX: 39.71 KG/M2 | DIASTOLIC BLOOD PRESSURE: 82 MMHG | SYSTOLIC BLOOD PRESSURE: 128 MMHG

## 2022-06-15 DIAGNOSIS — Z74.09 OTHER REDUCED MOBILITY: ICD-10-CM

## 2022-06-15 DIAGNOSIS — E66.01 SEVERE OBESITY (BMI 35.0-35.9 WITH COMORBIDITY): ICD-10-CM

## 2022-06-15 DIAGNOSIS — E11.69 TYPE 2 DIABETES MELLITUS WITH OTHER SPECIFIED COMPLICATION, WITHOUT LONG-TERM CURRENT USE OF INSULIN: ICD-10-CM

## 2022-06-15 DIAGNOSIS — N52.01 ERECTILE DYSFUNCTION DUE TO ARTERIAL INSUFFICIENCY: ICD-10-CM

## 2022-06-15 DIAGNOSIS — Z96.0 S/P INSERTION OF PENILE IMPLANT: ICD-10-CM

## 2022-06-15 DIAGNOSIS — E78.5 HYPERLIPIDEMIA, UNSPECIFIED HYPERLIPIDEMIA TYPE: ICD-10-CM

## 2022-06-15 DIAGNOSIS — M54.42 CHRONIC BILATERAL LOW BACK PAIN WITH BILATERAL SCIATICA: ICD-10-CM

## 2022-06-15 DIAGNOSIS — M54.41 CHRONIC BILATERAL LOW BACK PAIN WITH BILATERAL SCIATICA: ICD-10-CM

## 2022-06-15 DIAGNOSIS — Z00.00 ENCOUNTER FOR PREVENTIVE HEALTH EXAMINATION: Primary | ICD-10-CM

## 2022-06-15 DIAGNOSIS — N40.0 BENIGN PROSTATIC HYPERPLASIA, UNSPECIFIED WHETHER LOWER URINARY TRACT SYMPTOMS PRESENT: ICD-10-CM

## 2022-06-15 DIAGNOSIS — G89.29 CHRONIC BILATERAL LOW BACK PAIN WITH BILATERAL SCIATICA: ICD-10-CM

## 2022-06-15 LAB
ALBUMIN SERPL BCP-MCNC: 4 G/DL (ref 3.5–5.2)
ALP SERPL-CCNC: 89 U/L (ref 55–135)
ALT SERPL W/O P-5'-P-CCNC: 20 U/L (ref 10–44)
ANION GAP SERPL CALC-SCNC: 11 MMOL/L (ref 8–16)
AST SERPL-CCNC: 19 U/L (ref 10–40)
BASOPHILS # BLD AUTO: 0.02 K/UL (ref 0–0.2)
BASOPHILS NFR BLD: 0.3 % (ref 0–1.9)
BILIRUB SERPL-MCNC: 0.6 MG/DL (ref 0.1–1)
BUN SERPL-MCNC: 19 MG/DL (ref 8–23)
CALCIUM SERPL-MCNC: 9.6 MG/DL (ref 8.7–10.5)
CHLORIDE SERPL-SCNC: 103 MMOL/L (ref 95–110)
CHOLEST SERPL-MCNC: 149 MG/DL (ref 120–199)
CHOLEST/HDLC SERPL: 3.5 {RATIO} (ref 2–5)
CO2 SERPL-SCNC: 26 MMOL/L (ref 23–29)
CREAT SERPL-MCNC: 0.9 MG/DL (ref 0.5–1.4)
DIFFERENTIAL METHOD: NORMAL
EOSINOPHIL # BLD AUTO: 0.1 K/UL (ref 0–0.5)
EOSINOPHIL NFR BLD: 0.8 % (ref 0–8)
ERYTHROCYTE [DISTWIDTH] IN BLOOD BY AUTOMATED COUNT: 13.6 % (ref 11.5–14.5)
EST. GFR  (AFRICAN AMERICAN): >60 ML/MIN/1.73 M^2
EST. GFR  (NON AFRICAN AMERICAN): >60 ML/MIN/1.73 M^2
ESTIMATED AVG GLUCOSE: 146 MG/DL (ref 68–131)
GLUCOSE SERPL-MCNC: 143 MG/DL (ref 70–110)
HBA1C MFR BLD: 6.7 % (ref 4–5.6)
HCT VFR BLD AUTO: 43.1 % (ref 40–54)
HDLC SERPL-MCNC: 42 MG/DL (ref 40–75)
HDLC SERPL: 28.2 % (ref 20–50)
HGB BLD-MCNC: 14.8 G/DL (ref 14–18)
IMM GRANULOCYTES # BLD AUTO: 0.03 K/UL (ref 0–0.04)
IMM GRANULOCYTES NFR BLD AUTO: 0.5 % (ref 0–0.5)
LDLC SERPL CALC-MCNC: 84 MG/DL (ref 63–159)
LYMPHOCYTES # BLD AUTO: 1.9 K/UL (ref 1–4.8)
LYMPHOCYTES NFR BLD: 31.2 % (ref 18–48)
MCH RBC QN AUTO: 30.1 PG (ref 27–31)
MCHC RBC AUTO-ENTMCNC: 34.3 G/DL (ref 32–36)
MCV RBC AUTO: 88 FL (ref 82–98)
MONOCYTES # BLD AUTO: 0.5 K/UL (ref 0.3–1)
MONOCYTES NFR BLD: 8.9 % (ref 4–15)
NEUTROPHILS # BLD AUTO: 3.6 K/UL (ref 1.8–7.7)
NEUTROPHILS NFR BLD: 58.3 % (ref 38–73)
NONHDLC SERPL-MCNC: 107 MG/DL
NRBC BLD-RTO: 0 /100 WBC
PLATELET # BLD AUTO: 207 K/UL (ref 150–450)
PMV BLD AUTO: 10.1 FL (ref 9.2–12.9)
POTASSIUM SERPL-SCNC: 4.5 MMOL/L (ref 3.5–5.1)
PROT SERPL-MCNC: 7 G/DL (ref 6–8.4)
RBC # BLD AUTO: 4.91 M/UL (ref 4.6–6.2)
SODIUM SERPL-SCNC: 140 MMOL/L (ref 136–145)
TRIGL SERPL-MCNC: 115 MG/DL (ref 30–150)
WBC # BLD AUTO: 6.09 K/UL (ref 3.9–12.7)

## 2022-06-15 PROCEDURE — G0439 PR MEDICARE ANNUAL WELLNESS SUBSEQUENT VISIT: ICD-10-PCS | Mod: ,,, | Performed by: FAMILY MEDICINE

## 2022-06-15 PROCEDURE — 99999 PR PBB SHADOW E&M-EST. PATIENT-LVL V: CPT | Mod: PBBFAC,,, | Performed by: FAMILY MEDICINE

## 2022-06-15 PROCEDURE — 83036 HEMOGLOBIN GLYCOSYLATED A1C: CPT | Performed by: FAMILY MEDICINE

## 2022-06-15 PROCEDURE — G0439 PPPS, SUBSEQ VISIT: HCPCS | Mod: ,,, | Performed by: FAMILY MEDICINE

## 2022-06-15 PROCEDURE — 85025 COMPLETE CBC W/AUTO DIFF WBC: CPT | Performed by: FAMILY MEDICINE

## 2022-06-15 PROCEDURE — 99999 PR PBB SHADOW E&M-EST. PATIENT-LVL V: ICD-10-PCS | Mod: PBBFAC,,, | Performed by: FAMILY MEDICINE

## 2022-06-15 PROCEDURE — 80053 COMPREHEN METABOLIC PANEL: CPT | Performed by: FAMILY MEDICINE

## 2022-06-15 PROCEDURE — 99215 OFFICE O/P EST HI 40 MIN: CPT | Mod: PBBFAC,PN | Performed by: FAMILY MEDICINE

## 2022-06-15 PROCEDURE — 80061 LIPID PANEL: CPT | Performed by: FAMILY MEDICINE

## 2022-06-15 NOTE — PROGRESS NOTES
"  Cali Ho presented for a  Medicare AWV and comprehensive Health Risk Assessment today. The following components were reviewed and updated:    · Medical history  · Family History  · Social history  · Allergies and Current Medications  · Health Risk Assessment  · Health Maintenance  · Care Team         ** See Completed Assessments for Annual Wellness Visit within the encounter summary.**         The following assessments were completed:  · Living Situation  · CAGE  · Depression Screening  · Timed Get Up and Go  · Whisper Test  · Cognitive Function Screening  · Nutrition Screening  · ADL Screening  · PAQ Screening            Vitals:    06/15/22 1017 06/15/22 1042   BP: (!) 134/92 128/82   BP Location: Left arm Right arm   Patient Position: Sitting Sitting   BP Method: Medium (Manual) Medium (Manual)   Pulse: 82    Resp: 16    Temp: 98 °F (36.7 °C)    TempSrc: Oral    SpO2: (!) 93%    Weight: 114.8 kg (252 lb 15.7 oz)    Height: 5' 7" (1.702 m)      Body mass index is 39.62 kg/m².  Physical Exam  Vitals reviewed.   Constitutional:       Appearance: Normal appearance.   HENT:      Head: Normocephalic and atraumatic.   Eyes:      Pupils: Pupils are equal, round, and reactive to light.   Pulmonary:      Effort: Pulmonary effort is normal. No respiratory distress.   Skin:     General: Skin is warm and dry.   Neurological:      General: No focal deficit present.      Mental Status: He is alert and oriented to person, place, and time.   Psychiatric:         Mood and Affect: Mood normal.         Behavior: Behavior normal.       The 10-year ASCVD risk score (Sunnyvaleflavia KAISER Jr., et al., 2013) is: 30.3%    Values used to calculate the score:      Age: 72 years      Sex: Male      Is Non- : No      Diabetic: Yes      Tobacco smoker: No      Systolic Blood Pressure: 128 mmHg      Is BP treated: No      HDL Cholesterol: 50 mg/dL      Total Cholesterol: 152 mg/dL        Diagnoses and health risks identified " today and associated recommendations/orders:    1. Encounter for preventive health examination    2. Severe obesity (BMI 35.0-35.9 with comorbidity)  - CBC Auto Differential; Future  - Comprehensive Metabolic Panel; Future  Body mass index is 39.62 kg/m².  Continue healthy diet and regular exercise as tolerated.  Continue medications as prescribed.  Follow up with PCP, Jakob Romeo MD      3. Type 2 diabetes mellitus with other specified complication, without long-term current use of insulin  - CBC Auto Differential; Future  - Comprehensive Metabolic Panel; Future  - Hemoglobin A1C; Future  Continue medications as prescribed.  Follow up with PCP     4. Benign prostatic hyperplasia, unspecified whether lower urinary tract symptoms present  Stable  Continue medications as prescribed.  Follow up with PCP     5. Hyperlipidemia, unspecified hyperlipidemia type  - Lipid Panel; Future  Labs today  Continue medications as prescribed.  Follow up with PCP     6. Chronic bilateral low back pain with bilateral sciatica  Stable  Continue medications as prescribed.  Follow up with PCP and orthopedics    7. Erectile dysfunction due to arterial insufficiency  Stable  Continue medications as prescribed.  Follow up with PCP and urology, Dr Farooq    8. Other reduced mobility  Stable    9. S/P insertion of penile implant  Stable  Continue medications as prescribed.  Follow up with PCP and urology    Provided Cali with a 5-10 year written screening schedule and personal prevention plan. Recommendations were developed using the USPSTF age appropriate recommendations. Education, counseling, and referrals were provided as needed. After Visit Summary printed and given to patient which includes a list of additional screenings\tests needed.    Follow up if symptoms worsen or fail to improve, for scheduled appt, 1 year for AWV.    Fernanda De León NP        Future Appointments     Date Provider Specialty Appt Notes    6/15/2022  Lab .     6/28/2022 Jaron Hull, [facility] former pt dr gomez-brigid    8/1/2022 Sharon Yeung, PA Family Medicine 6month          I offered to discuss advanced care planning, including how to pick a person who would make decisions for you if you were unable to make them for yourself, called a health care power of , and what kind of decisions you might make such as use of life sustaining treatments such as ventilators and tube feeding when faced with a life limiting illness recorded on a living will that they will need to know. (How you want to be cared for as you near the end of your natural life)     X Patient is interested in learning more about how to make advanced directives.  I provided them paperwork and offered to discuss this with them.

## 2022-06-28 ENCOUNTER — OFFICE VISIT (OUTPATIENT)
Dept: FAMILY MEDICINE | Facility: CLINIC | Age: 72
End: 2022-06-28
Payer: MEDICARE

## 2022-06-28 VITALS
OXYGEN SATURATION: 94 % | SYSTOLIC BLOOD PRESSURE: 130 MMHG | WEIGHT: 254 LBS | HEIGHT: 67 IN | TEMPERATURE: 98 F | HEART RATE: 89 BPM | DIASTOLIC BLOOD PRESSURE: 82 MMHG | BODY MASS INDEX: 39.87 KG/M2

## 2022-06-28 DIAGNOSIS — Z12.5 ENCOUNTER FOR SCREENING FOR MALIGNANT NEOPLASM OF PROSTATE: ICD-10-CM

## 2022-06-28 DIAGNOSIS — E11.69 TYPE 2 DIABETES MELLITUS WITH OTHER SPECIFIED COMPLICATION, WITHOUT LONG-TERM CURRENT USE OF INSULIN: Primary | ICD-10-CM

## 2022-06-28 PROCEDURE — 99213 OFFICE O/P EST LOW 20 MIN: CPT | Mod: PBBFAC,PO | Performed by: NURSE PRACTITIONER

## 2022-06-28 PROCEDURE — 99214 PR OFFICE/OUTPT VISIT, EST, LEVL IV, 30-39 MIN: ICD-10-PCS | Mod: S$PBB,,, | Performed by: NURSE PRACTITIONER

## 2022-06-28 PROCEDURE — 99999 PR PBB SHADOW E&M-EST. PATIENT-LVL III: CPT | Mod: PBBFAC,,, | Performed by: NURSE PRACTITIONER

## 2022-06-28 PROCEDURE — 99214 OFFICE O/P EST MOD 30 MIN: CPT | Mod: S$PBB,,, | Performed by: NURSE PRACTITIONER

## 2022-06-28 PROCEDURE — 99999 PR PBB SHADOW E&M-EST. PATIENT-LVL III: ICD-10-PCS | Mod: PBBFAC,,, | Performed by: NURSE PRACTITIONER

## 2022-06-28 RX ORDER — LANCETS
EACH MISCELLANEOUS
Qty: 100 EACH | Refills: 11 | Status: SHIPPED | OUTPATIENT
Start: 2022-06-28 | End: 2022-06-30

## 2022-06-28 RX ORDER — INSULIN PUMP SYRINGE, 3 ML
EACH MISCELLANEOUS
Qty: 1 EACH | Refills: 0 | Status: SHIPPED | OUTPATIENT
Start: 2022-06-28 | End: 2022-06-30

## 2022-06-28 RX ORDER — METFORMIN HYDROCHLORIDE 500 MG/1
1000 TABLET ORAL 2 TIMES DAILY WITH MEALS
Qty: 360 TABLET | Refills: 0 | Status: SHIPPED | OUTPATIENT
Start: 2022-06-28 | End: 2022-11-25

## 2022-06-28 RX ORDER — ATORVASTATIN CALCIUM 10 MG/1
10 TABLET, FILM COATED ORAL NIGHTLY
Qty: 90 TABLET | Refills: 1 | Status: SHIPPED | OUTPATIENT
Start: 2022-06-28 | End: 2023-01-06

## 2022-06-28 RX ORDER — MELOXICAM 15 MG/1
15 TABLET ORAL DAILY PRN
Qty: 90 TABLET | Refills: 1 | Status: SHIPPED | OUTPATIENT
Start: 2022-06-28 | End: 2022-12-12 | Stop reason: ALTCHOICE

## 2022-06-28 NOTE — PATIENT INSTRUCTIONS
Leonardo Leiva,     If you are due for any health screening(s) below please notify me so we can arrange them to be ordered and scheduled to maintain your health. Most healthy patients complete it. Don't lose out on improving your health.     Health Maintenance   Topic Date Due    Foot Exam  07/12/2022    Eye Exam  07/13/2022    PROSTATE-SPECIFIC ANTIGEN  07/12/2022    Hemoglobin A1c  12/15/2022    Low Dose Statin  02/01/2023    Lipid Panel  06/15/2023    TETANUS VACCINE  07/12/2031    Hepatitis C Screening  Completed

## 2022-06-28 NOTE — PROGRESS NOTES
This dictation has been generated using Modal Fluency Dictation some phonetic errors may occur. Please contact author for clarification if needed.     Problem List Items Addressed This Visit     Type 2 diabetes mellitus with other specified complication - Primary    Relevant Medications    metFORMIN (GLUCOPHAGE) 500 MG tablet    Other Relevant Orders    Hemoglobin A1C    Comprehensive Metabolic Panel      Other Visit Diagnoses     Encounter for screening for malignant neoplasm of prostate        Relevant Orders    PSA, Screening          Orders Placed This Encounter    Hemoglobin A1C    Comprehensive Metabolic Panel    PSA, Screening    atorvastatin (LIPITOR) 10 MG tablet    metFORMIN (GLUCOPHAGE) 500 MG tablet    meloxicam (MOBIC) 15 MG tablet    blood-glucose meter kit    lancets Misc    blood sugar diagnostic Strp       Diabetes recent update of labs.  Recent annual wellness visit.  Patient had A1c which was acceptable.  We discussed his labs  Due for follow-up at end of year check A1c see when P prior and PSA.  Shared decision making around PSA and patient will proceed with testing.    Follow up in about 24 weeks (around 12/13/2022).    ________________________________________________________________  ________________________________________________________________      Chief Complaint   Patient presents with    Medication Refill     History of present illness  This 72 y.o. presents today for complaint of diabetes follow-up.  Had recent annual wellness visit.  Reviewed his A1c CMP and lipids with him.  They were acceptable.  Patient denies any side effects to meds.  He does remain active.  He does ask for a couple of refills.  No other complaints.  Health maintenance is up-to-date.        Past Medical History:   Diagnosis Date    Erectile dysfunction     Hyperlipidemia     Type 2 diabetes mellitus        Past Surgical History:   Procedure Laterality Date    HEMORRHOID SURGERY         History  reviewed. No pertinent family history.    Social History     Socioeconomic History    Marital status:    Tobacco Use    Smoking status: Never Smoker    Smokeless tobacco: Never Used   Substance and Sexual Activity    Alcohol use: Yes     Alcohol/week: 2.0 standard drinks     Types: 2 Cans of beer per week       Current Outpatient Medications   Medication Sig Dispense Refill    albuterol (PROVENTIL/VENTOLIN HFA) 90 mcg/actuation inhaler Inhale 2 puffs into the lungs every 6 (six) hours as needed for Wheezing. Rescue      CASCARA SAGRADA ORAL Take 450 mg by mouth.      esomeprazole (NEXIUM) 40 MG capsule Take 40 mg by mouth daily as needed.      fexofenadine (ALLEGRA) 180 MG tablet Take 180 mg by mouth.      lysine (L-LYSINE) 500 mg Tab Take 500 mg by mouth.      melatonin 1 mg Tab Take 1 mg by mouth.      multivitamin capsule Take 1 capsule by mouth once daily.      psyllium husk 0.4 gram Cap Take by mouth.      saw palmetto fruit 450 mg Cap Take 3 tablets by mouth every evening.      traMADoL (ULTRAM) 50 mg tablet Take 50 mg by mouth every 6 (six) hours as needed for Pain.      atorvastatin (LIPITOR) 10 MG tablet Take 1 tablet (10 mg total) by mouth every evening. 90 tablet 1    blood sugar diagnostic Strp To check BG 1 times daily, to use with insurance preferred meter 100 each 11    blood-glucose meter kit To check BG 1 times daily, to use with insurance preferred meter 1 each 0    esomeprazole (NEXIUM) 20 MG capsule Take 40 mg by mouth.      lancets Misc To check BG 1 times daily, to use with insurance preferred meter 100 each 11    meloxicam (MOBIC) 15 MG tablet Take 1 tablet (15 mg total) by mouth daily as needed. 90 tablet 1    metFORMIN (GLUCOPHAGE) 500 MG tablet Take 2 tablets (1,000 mg total) by mouth 2 (two) times daily with meals. 360 tablet 0     No current facility-administered medications for this visit.       Review of patient's allergies indicates:   Allergen Reactions     Dilaudid [hydromorphone]        Physical examination  Vitals Reviewed\  Vitals:    06/28/22 1110   BP: 130/82   Pulse: 89   Temp: 97.7 °F (36.5 °C)     Body mass index is 39.78 kg/m². .FLOWAMB[14    Weight: 115.2 kg (253 lb 15.5 oz)    Gen. Well-dressed well-nourished   Skin warm dry and intact.  No rashes noted.    Neck is supple without adenopathy  Chest.  Respirations are even unlabored.  Lungs are clear to auscultation.  Cardiac regular rate and rhythm.  No chest wall adenopathy noted.  Neuro. Awake alert oriented x4.  Normal judgment and cognition noted.  Extremities no clubbing cyanosis or edema noted.     Call or return to clinic prn if these symptoms worsen or fail to improve as anticipated.

## 2022-06-30 ENCOUNTER — TELEPHONE (OUTPATIENT)
Dept: FAMILY MEDICINE | Facility: CLINIC | Age: 72
End: 2022-06-30
Payer: MEDICARE

## 2022-07-05 ENCOUNTER — PATIENT MESSAGE (OUTPATIENT)
Dept: FAMILY MEDICINE | Facility: CLINIC | Age: 72
End: 2022-07-05
Payer: MEDICARE

## 2022-07-05 DIAGNOSIS — E11.69 TYPE 2 DIABETES MELLITUS WITH OTHER SPECIFIED COMPLICATION, WITHOUT LONG-TERM CURRENT USE OF INSULIN: Primary | ICD-10-CM

## 2022-07-05 NOTE — TELEPHONE ENCOUNTER
Pt prescriptions for lancets, meter, and strips sent to pharmacy without DX code. Unable to call in due to insurance. They require it be resent to pharmacy. Pt provider is currently out of the office, can you please review this request.

## 2022-07-06 ENCOUNTER — PATIENT MESSAGE (OUTPATIENT)
Dept: FAMILY MEDICINE | Facility: CLINIC | Age: 72
End: 2022-07-06
Payer: MEDICARE

## 2022-07-06 RX ORDER — LANCETS 33 GAUGE
EACH MISCELLANEOUS
Qty: 100 EACH | Refills: 11 | Status: SHIPPED | OUTPATIENT
Start: 2022-07-06

## 2022-07-06 RX ORDER — DEXTROSE 4 G
TABLET,CHEWABLE ORAL
Qty: 1 EACH | Refills: 0 | Status: SHIPPED | OUTPATIENT
Start: 2022-07-06

## 2022-07-12 NOTE — PATIENT INSTRUCTIONS
Counseling and Referral of Other Preventative  (Italic type indicates deductible and co-insurance are waived)    Patient Name: Cali Ho  Today's Date: 6/15/2022    Health Maintenance       Date Due Completion Date    Diabetes Urine Screening Never done ---    Shingles Vaccine (1 of 2) Never done ---    COVID-19 Vaccine (3 - Booster for Moderna series) 08/11/2021 3/11/2021    Foot Exam 07/12/2022 7/12/2021    Eye Exam 07/13/2022 7/13/2021    PROSTATE-SPECIFIC ANTIGEN 07/12/2022 7/12/2021    Lipid Panel 07/12/2022 7/12/2021    Hemoglobin A1c 08/01/2022 2/1/2022    Low Dose Statin 02/01/2023 2/1/2022    Colorectal Cancer Screening 10/01/2028 10/1/2018    TETANUS VACCINE 07/12/2031 7/12/2021        No orders of the defined types were placed in this encounter.    The following information is provided to all patients.  This information is to help you find resources for any of the problems found today that may be affecting your health:                Living healthy guide: www.[facility].[place].gov      Understanding Diabetes: www.diabetes.org      Eating healthy: www.cdc.gov/healthyweight      CDC home safety checklist: www.cdc.gov/steadi/patient.html      Agency on Aging: www.goea.[place].[facility]      Alcoholics anonymous (AA): www.aa.org      Physical Activity: www.jaymie.nih.gov/ql7blgp      Tobacco use: www.quitwithusla.org     
Eyeglasses

## 2022-12-06 ENCOUNTER — LAB VISIT (OUTPATIENT)
Dept: LAB | Facility: CLINIC | Age: 72
End: 2022-12-06
Payer: MEDICARE

## 2022-12-06 DIAGNOSIS — E11.69 TYPE 2 DIABETES MELLITUS WITH OTHER SPECIFIED COMPLICATION, WITHOUT LONG-TERM CURRENT USE OF INSULIN: ICD-10-CM

## 2022-12-06 DIAGNOSIS — Z12.5 ENCOUNTER FOR SCREENING FOR MALIGNANT NEOPLASM OF PROSTATE: ICD-10-CM

## 2022-12-06 LAB
ALBUMIN SERPL BCP-MCNC: 3.8 G/DL (ref 3.5–5.2)
ALP SERPL-CCNC: 83 U/L (ref 55–135)
ALT SERPL W/O P-5'-P-CCNC: 18 U/L (ref 10–44)
ANION GAP SERPL CALC-SCNC: 10 MMOL/L (ref 8–16)
AST SERPL-CCNC: 17 U/L (ref 10–40)
BILIRUB SERPL-MCNC: 0.4 MG/DL (ref 0.1–1)
BUN SERPL-MCNC: 19 MG/DL (ref 8–23)
CALCIUM SERPL-MCNC: 9 MG/DL (ref 8.7–10.5)
CHLORIDE SERPL-SCNC: 107 MMOL/L (ref 95–110)
CO2 SERPL-SCNC: 23 MMOL/L (ref 23–29)
COMPLEXED PSA SERPL-MCNC: 3.3 NG/ML (ref 0–4)
CREAT SERPL-MCNC: 0.9 MG/DL (ref 0.5–1.4)
EST. GFR  (NO RACE VARIABLE): >60 ML/MIN/1.73 M^2
ESTIMATED AVG GLUCOSE: 169 MG/DL (ref 68–131)
GLUCOSE SERPL-MCNC: 166 MG/DL (ref 70–110)
HBA1C MFR BLD: 7.5 % (ref 4–5.6)
POTASSIUM SERPL-SCNC: 4.3 MMOL/L (ref 3.5–5.1)
PROT SERPL-MCNC: 6.3 G/DL (ref 6–8.4)
SODIUM SERPL-SCNC: 140 MMOL/L (ref 136–145)

## 2022-12-06 PROCEDURE — 80053 COMPREHEN METABOLIC PANEL: CPT | Performed by: NURSE PRACTITIONER

## 2022-12-06 PROCEDURE — 83036 HEMOGLOBIN GLYCOSYLATED A1C: CPT | Performed by: NURSE PRACTITIONER

## 2022-12-06 PROCEDURE — 84153 ASSAY OF PSA TOTAL: CPT | Performed by: NURSE PRACTITIONER

## 2022-12-12 ENCOUNTER — OFFICE VISIT (OUTPATIENT)
Dept: FAMILY MEDICINE | Facility: CLINIC | Age: 72
End: 2022-12-12
Payer: MEDICARE

## 2022-12-12 VITALS
HEART RATE: 82 BPM | TEMPERATURE: 98 F | BODY MASS INDEX: 40 KG/M2 | OXYGEN SATURATION: 94 % | SYSTOLIC BLOOD PRESSURE: 130 MMHG | DIASTOLIC BLOOD PRESSURE: 74 MMHG | WEIGHT: 254.88 LBS | HEIGHT: 67 IN

## 2022-12-12 DIAGNOSIS — E11.69 TYPE 2 DIABETES MELLITUS WITH OTHER SPECIFIED COMPLICATION, WITHOUT LONG-TERM CURRENT USE OF INSULIN: Primary | ICD-10-CM

## 2022-12-12 DIAGNOSIS — L98.9 SKIN LESION OF FACE: ICD-10-CM

## 2022-12-12 DIAGNOSIS — E66.01 SEVERE OBESITY (BMI 35.0-39.9) WITH COMORBIDITY: ICD-10-CM

## 2022-12-12 DIAGNOSIS — E78.5 HYPERLIPIDEMIA, UNSPECIFIED HYPERLIPIDEMIA TYPE: ICD-10-CM

## 2022-12-12 PROCEDURE — 99999 PR PBB SHADOW E&M-EST. PATIENT-LVL IV: ICD-10-PCS | Mod: PBBFAC,,, | Performed by: NURSE PRACTITIONER

## 2022-12-12 PROCEDURE — 99214 OFFICE O/P EST MOD 30 MIN: CPT | Mod: PBBFAC,PO | Performed by: NURSE PRACTITIONER

## 2022-12-12 PROCEDURE — 99999 PR PBB SHADOW E&M-EST. PATIENT-LVL IV: CPT | Mod: PBBFAC,,, | Performed by: NURSE PRACTITIONER

## 2022-12-12 PROCEDURE — 99214 PR OFFICE/OUTPT VISIT, EST, LEVL IV, 30-39 MIN: ICD-10-PCS | Mod: S$PBB,,, | Performed by: NURSE PRACTITIONER

## 2022-12-12 PROCEDURE — 99214 OFFICE O/P EST MOD 30 MIN: CPT | Mod: S$PBB,,, | Performed by: NURSE PRACTITIONER

## 2022-12-12 RX ORDER — DICLOFENAC SODIUM 50 MG/1
50 TABLET, DELAYED RELEASE ORAL 2 TIMES DAILY
Qty: 60 TABLET | Refills: 2 | Status: SHIPPED | OUTPATIENT
Start: 2022-12-12 | End: 2023-03-07

## 2022-12-12 NOTE — PATIENT INSTRUCTIONS
"Leonardo Leiva,     If you are due for any health screening(s) below please notify me so we can arrange them to be ordered and scheduled to maintain your health. Most healthy patients complete it. Don't lose out on improving your health.     Tests to Keep You Healthy    Eye Exam: ORDERED BUT NOT SCHEDULED  Colon Cancer Screening: Met on 10/1/2018  Last HbA1c < 8 (12/06/2022): Yes                 Diabetic Retinal Eye Exam    Diabetes is the #1 cause of blindness in the [place] - early detection before signs or symptoms develop can prevent debilitating blindness.    Once-a-year screening is recommended for all diabetic patients. This exam can prevent and treat diabetes complications in the eye before developing symptoms. This can be done with a special camera is used to take photographs of the back of your eye without having to dilate them, or you can see an eye doctor for a full dilated exam.    Although you are still overdue for this important screening, due to the COVID-19 pandemic, we recommend scheduling it in the near future.        Patient Education       Checking Your Blood Pressure at Home   The Basics   Written by the doctors and editors at [facility]   How is blood pressure measured? -- Blood pressure is usually measured with a device that goes around your upper arm. This is often done in a doctor's office. But some people also check their blood pressure themselves, at home or at work.  Blood pressure is explained with 2 numbers. For instance, your blood pressure might be "140 over 90." The first (top) number is the pressure inside your arteries when your heart is willie. The second (bottom) number is the pressure inside your arteries when your heart is relaxed. The table shows how doctors and nurses define high and normal blood pressure (table 1).  If your blood pressure gets too high, it puts you at risk for heart attack, stroke, and kidney disease. High blood pressure does not usually cause symptoms. But it can " "be serious.  What is a home blood pressure meter? -- A home blood pressure meter (or "monitor") is a device you can use to check your blood pressure yourself. It has a cuff that goes around your upper arm (figure 1). Some devices have a cuff that goes around your wrist instead. But doctors aren't sure if these work as well. The meter also has a small screen, or dial, that shows your blood pressure numbers.  There are also special meters you can wear for a day or 2. These are different because they automatically check your blood pressure throughout the day and night, even while you are sleeping. If your doctor thinks you should use one of these devices, they will talk to you about how to wear it.  Why do I need to check my blood pressure at home? -- If your doctor knows or suspects that you have high blood pressure, they might want you to check it at home. There are a few reasons for this. Your doctor might want to look at:  Whether your blood pressure measures the same at home as it did in the doctor's office  How well your blood pressure medicines are working  Changes in your blood pressure, for example, if it goes up and down  People who check their own blood pressure at home usually do better at keeping it low.  How do I choose a home blood pressure meter? -- When choosing a home blood pressure meter, you will probably want to think about:  Cost - Some devices cost more than others. You should also check to see if your insurance will help pay for your device.  Size - It's important to make sure the cuff fits your arm comfortably. Your doctor or nurse can help you with this.  How easy it is to use - You should make sure you understand how to use the device. You also need to be able to read the numbers on the screen.  You do not need a prescription to buy a home blood pressure meter. You can buy them at most pharmacies or over the internet. Your doctor or nurse can help you choose the right device for you.  How do " I check my blood pressure at home? -- Once you have a home blood pressure meter, your doctor or nurse should check it to make sure it fits you and works correctly.  When it's time to check your blood pressure:  Go to the bathroom and empty your bladder first. Having a full bladder can temporarily increase your blood pressure, making the results inaccurate.  Sit in a chair with your feet flat on the ground.  Try to breathe normally and stay calm.  Attach the cuff to your arm. Place the cuff directly on your skin, not over your clothing. The cuff should be tight enough to not slip down, but not uncomfortably tight.  Sit and relax for about 3 to 5 minutes with the cuff on.  Follow the directions that came with your device to start measuring your blood pressure. This might involve squeezing the bulb at the end of the tube to inflate the cuff (fill it with air). With some monitors, you just need to press a button to inflate the cuff. When the cuff fills with air, it feels like someone is squeezing your arm, but it should not hurt. Then you will slowly deflate the cuff (let the air out of it), or it will deflate by itself. The screen or dial will show your blood pressure numbers.  Stay seated and relax for 1 minute, then measure your blood pressure again.  How often should I check my blood pressure? -- It depends. Different people need to follow different schedules. Your doctor or nurse will tell you how often to check your blood pressure, and when. Some people need to check their blood pressure twice a day, in the morning and evening.  Your doctor or nurse will probably tell you to keep track of your blood pressure for at least a few days (table 2). Then they will look at the numbers. The reason for this is that it's normal for your blood pressure to change a bit from day to day. For example, the numbers might change depending on whether you recently had caffeine, just exercised, or feel stressed. Checking your blood  "pressure over several days - or longer - will give your doctor or nurse a better idea of what is average for you.  How should I keep track of my blood pressure? -- Some blood pressure meters will record your numbers for you, or send them to your computer or smartphone. If yours does not do this, you will need to write them down. Your doctor or nurse can help you figure out the best way to keep track of the numbers.  What if my blood pressure is high? -- Your doctor or nurse will tell you what to do if your blood pressure is high when you check it at home. If you get a number that is higher than normal, measure it again to see if it is still high. If it is very high (above a certain number, which your doctor or nurse will tell you to watch out for), you should call your doctor right away.  If your blood pressure is only a little high, your doctor or nurse might tell you to keep checking it for a few more days or weeks, and then call if it does not go back down. Then they can help you decide what to do next.  All topics are updated as new evidence becomes available and our peer review process is complete.  This topic retrieved from [employer] on: Sep 21, 2021.  Topic 145279 Version 4.0  Release: 29.4.2 - C29.263  © 2021 UpToDate, Inc. and/or its affiliates. All rights reserved.  table 1: Definition of normal and high blood pressure  Level  Top number  Bottom number    High 130 or above 80 or above   Elevated 120 to 129 79 or below   Normal 119 or below 79 or below   These definitions are from the American College of Cardiology/American Heart Association. Other expert groups might use slightly different definitions.  "Elevated blood pressure" is a term doctor or nurses use as a warning. It means you do not yet have high blood pressure, but your blood pressure is not as low as it should be for good health.  Graphic 46538 Version 6.0  figure 1: Using a home blood pressure meter     This is an example of a person using a " home blood pressure meter.  [facility] [address] Version 1.0    table 2: 7-day diary for checking blood pressure at home  Day 1  Day 2  Day 3  Day 4  Day 5  Day 6  Day 7    Morning  1st read Morning  1st read Morning  1st read Morning  1st read Morning  1st read Morning  1st read Morning  1st read   Systolic: __________ Systolic: __________ Systolic: __________ Systolic: __________ Systolic: __________ Systolic: __________ Systolic: __________   Diastolic: __________ Diastolic: __________ Diastolic: __________ Diastolic: __________ Diastolic: __________ Diastolic: __________ Diastolic: __________   Pulse: __________ Pulse: __________ Pulse: __________ Pulse: __________ Pulse: __________ Pulse: __________ Pulse: __________   Morning  2nd read Morning  2nd read Morning  2nd read Morning  2nd read Morning  2nd read Morning  2nd read Morning  2nd read   Systolic: __________ Systolic: __________ Systolic: __________ Systolic: __________ Systolic: __________ Systolic: __________ Systolic: __________   Diastolic: __________ Diastolic: __________ Diastolic: __________ Diastolic: __________ Diastolic: __________ Diastolic: __________ Diastolic: __________   Pulse: __________ Pulse: __________ Pulse: __________ Pulse: __________ Pulse: __________ Pulse: __________ Pulse: __________   Evening  1st read Evening  1st read Evening  1st read Evening  1st read Evening  1st read Evening  1st read Evening  1st read   Systolic: __________ Systolic: __________ Systolic: __________ Systolic: __________ Systolic: __________ Systolic: __________ Systolic: __________   Diastolic: __________ Diastolic: __________ Diastolic: __________ Diastolic: __________ Diastolic: __________ Diastolic: __________ Diastolic: __________   Pulse: __________ Pulse: __________ Pulse: __________ Pulse: __________ Pulse: __________ Pulse: __________ Pulse: __________   Evening  2nd read Evening  2nd read Evening  2nd read Evening  2nd read Evening  2nd read Evening   2nd read Evening  2nd read   Systolic: __________ Systolic: __________ Systolic: __________ Systolic: __________ Systolic: __________ Systolic: __________ Systolic: __________   Diastolic: __________ Diastolic: __________ Diastolic: __________ Diastolic: __________ Diastolic: __________ Diastolic: __________ Diastolic: __________   Pulse: __________ Pulse: __________ Pulse: __________ Pulse: __________ Pulse: __________ Pulse: __________ Pulse: __________   Notes    Notes    Notes    Notes    Notes    Notes    Notes      ____________________ ____________________ ____________________ ____________________ ____________________ ____________________ ____________________   ____________________ ____________________ ____________________ ____________________ ____________________ ____________________ ____________________   ____________________ ____________________ ____________________ ____________________ ____________________ ____________________ ____________________   Patient name: ______________________________     Patient ID: ________________________________    Primary care provider: _______________________    Average BP: _______________________________    Graphic 201757 Version 1.0  Consumer Information Use and Disclaimer   This information is not specific medical advice and does not replace information you receive from your health care provider. This is only a brief summary of general information. It does NOT include all information about conditions, illnesses, injuries, tests, procedures, treatments, therapies, discharge instructions or life-style choices that may apply to you. You must talk with your health care provider for complete information about your health and treatment options. This information should not be used to decide whether or not to accept your health care provider's advice, instructions or recommendations. Only your health care provider has the knowledge and training to provide advice that is right for  you. The use of this information is governed by the [employer] End User License Agreement, available at https://www.[employer].feedPack/en/solutions/[place]/about/monae.The use of [employer] content is governed by the [employer] Terms of Use. ©2021 UpToDate, Inc. All rights reserved.  Copyright   © 2021 UpToDate, Inc. and/or its affiliates. All rights reserved.

## 2022-12-12 NOTE — PROGRESS NOTES
This dictation has been generated using Modal Fluency Dictation some phonetic errors may occur. Please contact author for clarification if needed.     Problem List Items Addressed This Visit       Type 2 diabetes mellitus with other specified complication - Primary    Relevant Orders    Diabetic Eye Screening Photo    Ambulatory referral/consult to Optometry    Severe obesity (BMI 35.0-39.9) with comorbidity    Hyperlipidemia    Overview     Current statin therapy          Other Visit Diagnoses       Skin lesion of face        Relevant Orders    Ambulatory referral/consult to Dermatology            Orders Placed This Encounter    Ambulatory referral/consult to Optometry    Ambulatory referral/consult to Dermatology    Diabetic Eye Screening Photo    diclofenac (VOLTAREN) 50 MG EC tablet       Diabetes suboptimal control. A1c>7 recent update of labs.    Patient had A1c which was acceptable.  We discussed his labs date diabetic.  Refer to Optometry and check diabetic eye photo.  Obesity recommend diet exercise wt loss  HLP Controlled. continue statin.   Skin lesion face refer derm.     Follow up in about 3 months (around 3/12/2023).    ________________________________________________________________  ________________________________________________________________      Chief Complaint   Patient presents with    Follow-up     History of present illness  This 72 y.o. presents today for complaint of physical.  Patient has diabetes obesity hyperlipidemia.  He is concerned about a spot on his face.  LOV diabetes follow-up.  Had recent annual wellness visit.  Reviewed his A1c CMP and lipids with him.  They were acceptable.  Patient denies any side effects to meds.  He does remain active.  He does ask for a couple of refills.  No other complaints.  Health maintenance is up-to-date.        Past Medical History:   Diagnosis Date    Erectile dysfunction     Hyperlipidemia     Type 2 diabetes mellitus        Past Surgical  History:   Procedure Laterality Date    HEMORRHOID SURGERY         History reviewed. No pertinent family history.    Social History     Socioeconomic History    Marital status:    Tobacco Use    Smoking status: Never    Smokeless tobacco: Never   Substance and Sexual Activity    Alcohol use: Yes     Alcohol/week: 2.0 standard drinks     Types: 2 Cans of beer per week       Current Outpatient Medications   Medication Sig Dispense Refill    albuterol (PROVENTIL/VENTOLIN HFA) 90 mcg/actuation inhaler Inhale 2 puffs into the lungs every 6 (six) hours as needed for Wheezing. Rescue      atorvastatin (LIPITOR) 10 MG tablet Take 1 tablet (10 mg total) by mouth every evening. 90 tablet 1    blood sugar diagnostic (TRUE METRIX GLUCOSE TEST STRIP) Strp TO CHECK BG 1 TIMES DAILY, TO USE WITH INSURANCE PREFERRED METER 100 strip 11    blood-glucose meter (TRUE METRIX AIR GLUCOSE METER) Misc TO CHECK BG 1 TIMES DAILY, TO USE WITH INSURANCE PREFERRED METER 1 each 0    CASCARA SAGRADA ORAL Take 450 mg by mouth.      esomeprazole (NEXIUM) 20 MG capsule Take 40 mg by mouth.      fexofenadine (ALLEGRA) 180 MG tablet Take 180 mg by mouth.      lancets 33 gauge Misc TO CHECK BG 1 TIMES DAILY, TO USE WITH INSURANCE PREFERRED METER 100 each 11    lysine (L-LYSINE) 500 mg Tab Take 500 mg by mouth.      melatonin 1 mg Tab Take 1 mg by mouth.      metFORMIN (GLUCOPHAGE) 500 MG tablet TAKE 2 TABLETS BY MOUTH TWICE A DAY WITH MEALS 360 tablet 0    multivitamin capsule Take 1 capsule by mouth once daily.      psyllium husk 0.4 gram Cap Take by mouth.      saw palmetto fruit 450 mg Cap Take 3 tablets by mouth every evening.      traMADoL (ULTRAM) 50 mg tablet Take 50 mg by mouth every 6 (six) hours as needed for Pain.      diclofenac (VOLTAREN) 50 MG EC tablet Take 1 tablet (50 mg total) by mouth 2 (two) times daily. 60 tablet 2     No current facility-administered medications for this visit.       Review of patient's allergies indicates:    Allergen Reactions    Dilaudid [hydromorphone]        Physical examination  Vitals Reviewed\  Vitals:    12/12/22 1437   BP: 130/74   Pulse: 82   Temp:      Body mass index is 39.92 kg/m².   Weight: 115.6 kg (254 lb 13.6 oz)    Gen. Well-dressed well-nourished   Skin warm dry and intact.  No rashes noted.    Neck is supple without adenopathy  Chest.  Respirations are even unlabored.  Lungs are clear to auscultation.  Cardiac regular rate and rhythm.  No chest wall adenopathy noted.  Neuro. Awake alert oriented x4.  Normal judgment and cognition noted.  Extremities no clubbing cyanosis or edema noted.     Call or return to clinic prn if these symptoms worsen or fail to improve as anticipated.

## 2023-03-13 ENCOUNTER — OFFICE VISIT (OUTPATIENT)
Dept: FAMILY MEDICINE | Facility: CLINIC | Age: 73
End: 2023-03-13
Payer: MEDICARE

## 2023-03-13 VITALS
HEART RATE: 91 BPM | BODY MASS INDEX: 39.38 KG/M2 | OXYGEN SATURATION: 96 % | TEMPERATURE: 98 F | DIASTOLIC BLOOD PRESSURE: 78 MMHG | WEIGHT: 250.88 LBS | HEIGHT: 67 IN | SYSTOLIC BLOOD PRESSURE: 122 MMHG

## 2023-03-13 DIAGNOSIS — E66.01 SEVERE OBESITY (BMI 35.0-39.9) WITH COMORBIDITY: ICD-10-CM

## 2023-03-13 DIAGNOSIS — E78.5 HYPERLIPIDEMIA, UNSPECIFIED HYPERLIPIDEMIA TYPE: ICD-10-CM

## 2023-03-13 DIAGNOSIS — N40.0 BENIGN PROSTATIC HYPERPLASIA, UNSPECIFIED WHETHER LOWER URINARY TRACT SYMPTOMS PRESENT: ICD-10-CM

## 2023-03-13 DIAGNOSIS — E11.69 TYPE 2 DIABETES MELLITUS WITH OTHER SPECIFIED COMPLICATION, WITHOUT LONG-TERM CURRENT USE OF INSULIN: Primary | ICD-10-CM

## 2023-03-13 PROCEDURE — 99999 PR PBB SHADOW E&M-EST. PATIENT-LVL IV: CPT | Mod: PBBFAC,,, | Performed by: NURSE PRACTITIONER

## 2023-03-13 PROCEDURE — 99214 PR OFFICE/OUTPT VISIT, EST, LEVL IV, 30-39 MIN: ICD-10-PCS | Mod: S$PBB,,, | Performed by: NURSE PRACTITIONER

## 2023-03-13 PROCEDURE — 99214 OFFICE O/P EST MOD 30 MIN: CPT | Mod: PBBFAC,PO | Performed by: NURSE PRACTITIONER

## 2023-03-13 PROCEDURE — 99214 OFFICE O/P EST MOD 30 MIN: CPT | Mod: S$PBB,,, | Performed by: NURSE PRACTITIONER

## 2023-03-13 PROCEDURE — 99999 PR PBB SHADOW E&M-EST. PATIENT-LVL IV: ICD-10-PCS | Mod: PBBFAC,,, | Performed by: NURSE PRACTITIONER

## 2023-03-13 NOTE — PROGRESS NOTES
This dictation has been generated using Modal Fluency Dictation some phonetic errors may occur. Please contact author for clarification if needed.     Problem List Items Addressed This Visit       BPH (benign prostatic hyperplasia)    Type 2 diabetes mellitus with other specified complication - Primary    Relevant Orders    Comprehensive Metabolic Panel    Hemoglobin A1C    Severe obesity (BMI 35.0-39.9) with comorbidity    Hyperlipidemia    Overview     Current statin therapy         Relevant Orders    Comprehensive Metabolic Panel    Hemoglobin A1C       Orders Placed This Encounter    Comprehensive Metabolic Panel    Hemoglobin A1C       Diabetes suboptimal control. A1c>7 on last labs.  Due for labs this week.   Obesity recommend diet exercise wt loss  HLP Controlled. continue statin.   Skin lesion face refer derm.     No follow-ups on file.    ________________________________________________________________  ________________________________________________________________      Chief Complaint   Patient presents with    Follow-up     History of present illness  This 72 y.o. presents today for complaint of following up on diabetes and hypertension.  Did not have lab orders prior to visit.  Notes home blood sugar readings and blood pressure fairly normal.  No stated complaints.  No BPH symptoms.   LOV 12/2022 physical.  Patient has diabetes obesity hyperlipidemia.  He is concerned about a spot on his face.  LOV diabetes follow-up.  Had recent annual wellness visit.  Reviewed his A1c CMP and lipids with him.  They were acceptable.  Patient denies any side effects to meds.  He does remain active.  He does ask for a couple of refills.  No other complaints.  Health maintenance is up-to-date.        Past Medical History:   Diagnosis Date    Erectile dysfunction     Hyperlipidemia     Type 2 diabetes mellitus        Past Surgical History:   Procedure Laterality Date    HEMORRHOID SURGERY         History reviewed. No  pertinent family history.    Social History     Socioeconomic History    Marital status:    Tobacco Use    Smoking status: Never    Smokeless tobacco: Never   Substance and Sexual Activity    Alcohol use: Yes     Alcohol/week: 2.0 standard drinks     Types: 2 Cans of beer per week       Current Outpatient Medications   Medication Sig Dispense Refill    albuterol (PROVENTIL/VENTOLIN HFA) 90 mcg/actuation inhaler Inhale 2 puffs into the lungs every 6 (six) hours as needed for Wheezing. Rescue      atorvastatin (LIPITOR) 10 MG tablet TAKE 1 TABLET BY MOUTH EVERY DAY IN THE EVENING 90 tablet 1    blood sugar diagnostic (TRUE METRIX GLUCOSE TEST STRIP) Strp TO CHECK BG 1 TIMES DAILY, TO USE WITH INSURANCE PREFERRED METER 100 strip 11    blood-glucose meter (TRUE METRIX AIR GLUCOSE METER) Misc TO CHECK BG 1 TIMES DAILY, TO USE WITH INSURANCE PREFERRED METER 1 each 0    CASCARA SAGRADA ORAL Take 450 mg by mouth.      diclofenac (VOLTAREN) 50 MG EC tablet TAKE 1 TABLET BY MOUTH TWICE A DAY 60 tablet 2    esomeprazole (NEXIUM) 20 MG capsule Take 40 mg by mouth.      fexofenadine (ALLEGRA) 180 MG tablet Take 180 mg by mouth.      lancets 33 gauge Misc TO CHECK BG 1 TIMES DAILY, TO USE WITH INSURANCE PREFERRED METER 100 each 11    lysine (L-LYSINE) 500 mg Tab Take 500 mg by mouth.      melatonin 1 mg Tab Take 1 mg by mouth.      metFORMIN (GLUCOPHAGE) 500 MG tablet TAKE 2 TABLETS BY MOUTH TWICE A DAY WITH MEALS 360 tablet 1    multivitamin capsule Take 1 capsule by mouth once daily.      psyllium husk 0.4 gram Cap Take by mouth.      saw palmetto fruit 450 mg Cap Take 3 tablets by mouth every evening.      traMADoL (ULTRAM) 50 mg tablet Take 50 mg by mouth every 6 (six) hours as needed for Pain.      meloxicam (MOBIC) 15 MG tablet TAKE 1 TABLET BY MOUTH DAILY AS NEEDED. 90 tablet 1     No current facility-administered medications for this visit.       Review of patient's allergies indicates:   Allergen Reactions     Dilaudid [hydromorphone]        Physical examination  Vitals Reviewed\  Vitals:    03/13/23 1341   BP: 122/78   Pulse:    Temp:      Body mass index is 39.29 kg/m².   Weight: 113.8 kg (250 lb 14.1 oz)    Gen. Well-dressed well-nourished   Skin warm dry and intact.  No rashes noted.    Neck is supple without adenopathy  Chest.  Respirations are even unlabored.  Lungs are clear to auscultation.  Cardiac regular rate and rhythm.  No chest wall adenopathy noted.  Neuro. Awake alert oriented x4.  Normal judgment and cognition noted.  Extremities no clubbing cyanosis or edema noted.     Call or return to clinic prn if these symptoms worsen or fail to improve as anticipated.

## 2023-03-13 NOTE — PATIENT INSTRUCTIONS
Leonardo Leiva,     If you are due for any health screening(s) below please notify me so we can arrange them to be ordered and scheduled to maintain your health. Most healthy patients complete it. Don't lose out on improving your health.     Tests to Keep You Healthy    Eye Exam: ORDERED BUT NOT SCHEDULED  Colon Cancer Screening: Met on 10/1/2018  Last HbA1c < 8 (12/06/2022): Yes                 Diabetic Retinal Eye Exam    Diabetes is the #1 cause of blindness in the [place] - early detection before signs or symptoms develop can prevent debilitating blindness.    Once-a-year screening is recommended for all diabetic patients. This exam can prevent and treat diabetes complications in the eye before developing symptoms. This can be done with a special camera is used to take photographs of the back of your eye without having to dilate them, or you can see an eye doctor for a full dilated exam.    Although you are still overdue for this important screening, due to the COVID-19 pandemic, we recommend scheduling it in the near future.

## 2023-03-15 ENCOUNTER — LAB VISIT (OUTPATIENT)
Dept: LAB | Facility: CLINIC | Age: 73
End: 2023-03-15
Payer: MEDICARE

## 2023-03-15 DIAGNOSIS — E78.5 HYPERLIPIDEMIA, UNSPECIFIED HYPERLIPIDEMIA TYPE: ICD-10-CM

## 2023-03-15 DIAGNOSIS — E11.69 TYPE 2 DIABETES MELLITUS WITH OTHER SPECIFIED COMPLICATION, WITHOUT LONG-TERM CURRENT USE OF INSULIN: ICD-10-CM

## 2023-03-15 LAB
ALBUMIN SERPL BCP-MCNC: 4.1 G/DL (ref 3.5–5.2)
ALP SERPL-CCNC: 74 U/L (ref 55–135)
ALT SERPL W/O P-5'-P-CCNC: 21 U/L (ref 10–44)
ANION GAP SERPL CALC-SCNC: 12 MMOL/L (ref 8–16)
AST SERPL-CCNC: 19 U/L (ref 10–40)
BILIRUB SERPL-MCNC: 0.6 MG/DL (ref 0.1–1)
BUN SERPL-MCNC: 18 MG/DL (ref 8–23)
CALCIUM SERPL-MCNC: 9.6 MG/DL (ref 8.7–10.5)
CHLORIDE SERPL-SCNC: 107 MMOL/L (ref 95–110)
CO2 SERPL-SCNC: 21 MMOL/L (ref 23–29)
CREAT SERPL-MCNC: 1 MG/DL (ref 0.5–1.4)
EST. GFR  (NO RACE VARIABLE): >60 ML/MIN/1.73 M^2
ESTIMATED AVG GLUCOSE: 146 MG/DL (ref 68–131)
GLUCOSE SERPL-MCNC: 123 MG/DL (ref 70–110)
HBA1C MFR BLD: 6.7 % (ref 4–5.6)
POTASSIUM SERPL-SCNC: 4.2 MMOL/L (ref 3.5–5.1)
PROT SERPL-MCNC: 6.8 G/DL (ref 6–8.4)
SODIUM SERPL-SCNC: 140 MMOL/L (ref 136–145)

## 2023-03-15 PROCEDURE — 83036 HEMOGLOBIN GLYCOSYLATED A1C: CPT | Performed by: NURSE PRACTITIONER

## 2023-03-15 PROCEDURE — 80053 COMPREHEN METABOLIC PANEL: CPT | Performed by: NURSE PRACTITIONER

## 2023-03-20 ENCOUNTER — OFFICE VISIT (OUTPATIENT)
Dept: OPHTHALMOLOGY | Facility: CLINIC | Age: 73
End: 2023-03-20
Payer: MEDICARE

## 2023-03-20 DIAGNOSIS — H40.013 OAG (OPEN ANGLE GLAUCOMA) SUSPECT, LOW RISK, BILATERAL: ICD-10-CM

## 2023-03-20 DIAGNOSIS — H25.13 NUCLEAR SCLEROTIC CATARACT, BILATERAL: ICD-10-CM

## 2023-03-20 DIAGNOSIS — H53.042 AMBLYOPIA SUSPECT, LEFT EYE: ICD-10-CM

## 2023-03-20 DIAGNOSIS — E11.9 TYPE 2 DIABETES MELLITUS WITHOUT RETINOPATHY: Primary | ICD-10-CM

## 2023-03-20 PROCEDURE — 99204 OFFICE O/P NEW MOD 45 MIN: CPT | Mod: S$PBB,,, | Performed by: OPHTHALMOLOGY

## 2023-03-20 PROCEDURE — 99999 PR PBB SHADOW E&M-EST. PATIENT-LVL II: CPT | Mod: PBBFAC,,, | Performed by: OPHTHALMOLOGY

## 2023-03-20 PROCEDURE — 92015 DETERMINE REFRACTIVE STATE: CPT | Mod: ,,, | Performed by: OPHTHALMOLOGY

## 2023-03-20 PROCEDURE — 99999 PR PBB SHADOW E&M-EST. PATIENT-LVL II: ICD-10-PCS | Mod: PBBFAC,,, | Performed by: OPHTHALMOLOGY

## 2023-03-20 PROCEDURE — 99212 OFFICE O/P EST SF 10 MIN: CPT | Mod: PBBFAC,PO | Performed by: OPHTHALMOLOGY

## 2023-03-20 PROCEDURE — 92015 PR REFRACTION: ICD-10-PCS | Mod: ,,, | Performed by: OPHTHALMOLOGY

## 2023-03-20 PROCEDURE — 92133 CPTRZD OPH DX IMG PST SGM ON: CPT | Mod: PBBFAC,PO | Performed by: OPHTHALMOLOGY

## 2023-03-20 PROCEDURE — 76514 ECHO EXAM OF EYE THICKNESS: CPT | Mod: 26,S$PBB,, | Performed by: OPHTHALMOLOGY

## 2023-03-20 PROCEDURE — 76514 PR  US, EYE, FOR CORNEAL THICKNESS: ICD-10-PCS | Mod: 26,S$PBB,, | Performed by: OPHTHALMOLOGY

## 2023-03-20 PROCEDURE — 99204 PR OFFICE/OUTPT VISIT, NEW, LEVL IV, 45-59 MIN: ICD-10-PCS | Mod: S$PBB,,, | Performed by: OPHTHALMOLOGY

## 2023-03-20 PROCEDURE — 76514 ECHO EXAM OF EYE THICKNESS: CPT | Mod: PBBFAC,PO | Performed by: OPHTHALMOLOGY

## 2023-03-20 PROCEDURE — 92133 POSTERIOR SEGMENT OCT OPTIC NERVE(OCULAR COHERENCE TOMOGRAPHY) - OU - BOTH EYES: ICD-10-PCS | Mod: 26,S$PBB,, | Performed by: OPHTHALMOLOGY

## 2023-03-20 NOTE — PROGRESS NOTES
HPI    New pt her for DM exam. C/o trouble with vision. States reading and   distance are blurry. States lots of blurriness. Has been going on for   about 1 year. Pt states DM is stable. Denies pain/ FOL.  States seeing   floaters more often OU.      Fam hx of glaucoma. Was being followed by Dr Downing ([facility]) for   glaucoma suspect. Has not been seen in a while. Has never used any gtts.       Hemoglobin A1C       Date                     Value               Ref Range             Status                03/15/2023               6.7 (H)             4.0 - 5.6 %           Final                   12/06/2022               7.5 (H)             4.0 - 5.6 %           Final                   06/15/2022               6.7 (H)             4.0 - 5.6 %           Final                Last edited by Eunice Zacarias on 3/20/2023  1:45 PM.            Assessment /Plan     For exam results, see Encounter Report.    Type 2 diabetes mellitus without retinopathy  -     Ambulatory referral/consult to Optometry    OAG (open angle glaucoma) suspect, low risk, bilateral    Nuclear sclerotic cataract, bilateral    Amblyopia suspect, left eye      1. Type 2 diabetes mellitus without retinopathy  Diabetes without retinopathy, discussed with patient importance of glucose control and follow up.  Patient voices understanding.    2. OAG (open angle glaucoma) suspect, low risk, bilateral  +famhx  Mildly thin pachy    ONH mildly suspicous OS>OD  OCT NFL normal  IOP low normal    Low risk, follow off meds    F/u 6 months, HVF, gonio    3. Nuclear sclerotic cataract, bilateral  Mild to moderate  New glasses Rx today    4. Amblyopia suspect, left eye  Monitor

## 2023-03-20 NOTE — PATIENT INSTRUCTIONS
What are floaters?     Floaters look like small specks, dots, circles, lines or cobwebs in your field of vision. While they seem to be in front of your eye, they are floating inside. Floaters are tiny clumps of gel or cells inside the vitreous that fills your eye. What you see are the shadows these clumps cast on your retina.    You usually notice floaters when looking  at something plain, like a blank wall or a blue jono.    As we age, our vitreous starts to thicken or shrink. Sometimes clumps or strands form in the vitreous. If the vitreous pulls away from the back of the eye, it is called posterior vitreous detachment. Floaters usually happen with posterior vitreous detachment. They are not serious, and they tend to fade or go away over time. Severe floaters can be removed by surgery, but this has risks and is seldom necessary.    You are more likely to get floaters if you:    are nearsighted (you need glasses to see far away)    have had surgery for cataracts    have had inflammation (swelling) inside the eye      What are flashes?     Flashes can look like flashing lights or lightning streaks in your field of vision. Some people compare them to seeing stars after being hit on the head. You might see flashes on and off for weeks, or even months. Flashes happen when the vitreous rubs or pulls on your retina.    As people age, it is common to see flashes occasionally.       When floaters and flashes are serious     Most floaters and flashes are not a problem. However, there are times when they can be signs of a serious condition. Here is when you should call an ophthalmologist right away:    you notice a lot of new floaters    you have a lot of flashes    a shadow appears in your peripheral (side) vision    a gray curtain covers part of  your vision    These floaters and flashes could be symptoms of a torn or detached retina. This is when the retina pulls away from the back of your eye. This is a serious  condition that needs to be treated.    Summary    Floaters are dark specks or dots in your field of vision. They are shadows you see from clumps of vitreous gel in your eye. Flashes are flashes of light that look like lightning streaks in your field of vision. Flashes occur when the vitreous gel rubs or pulls on your retina.    Floaters and flashes are quite common as people age. However, they can be signs of a retinal detachment, which is a serious problem. If you suddenly have a lot of floaters and see flashes, and you notice changes in your vision, call your ophthalmologist right away.

## 2023-04-10 ENCOUNTER — OFFICE VISIT (OUTPATIENT)
Dept: DERMATOLOGY | Facility: CLINIC | Age: 73
End: 2023-04-10
Payer: MEDICARE

## 2023-04-10 VITALS — HEIGHT: 67 IN | BODY MASS INDEX: 39.24 KG/M2 | WEIGHT: 250 LBS

## 2023-04-10 DIAGNOSIS — L57.8 ACTINIC SKIN DAMAGE: ICD-10-CM

## 2023-04-10 DIAGNOSIS — L57.0 ACTINIC KERATOSIS: ICD-10-CM

## 2023-04-10 DIAGNOSIS — L81.4 LENTIGO: Primary | ICD-10-CM

## 2023-04-10 DIAGNOSIS — D22.9 BENIGN NEVUS: ICD-10-CM

## 2023-04-10 DIAGNOSIS — L82.1 SEBORRHEIC KERATOSES: ICD-10-CM

## 2023-04-10 DIAGNOSIS — D18.01 CHERRY ANGIOMA: ICD-10-CM

## 2023-04-10 PROCEDURE — 99999 PR PBB SHADOW E&M-EST. PATIENT-LVL IV: ICD-10-PCS | Mod: PBBFAC,,, | Performed by: STUDENT IN AN ORGANIZED HEALTH CARE EDUCATION/TRAINING PROGRAM

## 2023-04-10 PROCEDURE — 99203 PR OFFICE/OUTPT VISIT, NEW, LEVL III, 30-44 MIN: ICD-10-PCS | Mod: 25,S$PBB,, | Performed by: STUDENT IN AN ORGANIZED HEALTH CARE EDUCATION/TRAINING PROGRAM

## 2023-04-10 PROCEDURE — 17003 DESTRUCTION, PREMALIGNANT LESIONS; SECOND THROUGH 14 LESIONS: ICD-10-PCS | Mod: S$PBB,,, | Performed by: STUDENT IN AN ORGANIZED HEALTH CARE EDUCATION/TRAINING PROGRAM

## 2023-04-10 PROCEDURE — 17000 DESTRUCT PREMALG LESION: CPT | Mod: PBBFAC,PO | Performed by: STUDENT IN AN ORGANIZED HEALTH CARE EDUCATION/TRAINING PROGRAM

## 2023-04-10 PROCEDURE — 17003 DESTRUCT PREMALG LES 2-14: CPT | Mod: S$PBB,,, | Performed by: STUDENT IN AN ORGANIZED HEALTH CARE EDUCATION/TRAINING PROGRAM

## 2023-04-10 PROCEDURE — 17000 PR DESTRUCTION(LASER SURGERY,CRYOSURGERY,CHEMOSURGERY),PREMALIGNANT LESIONS,FIRST LESION: ICD-10-PCS | Mod: S$PBB,,, | Performed by: STUDENT IN AN ORGANIZED HEALTH CARE EDUCATION/TRAINING PROGRAM

## 2023-04-10 PROCEDURE — 17003 DESTRUCT PREMALG LES 2-14: CPT | Mod: PBBFAC,PO | Performed by: STUDENT IN AN ORGANIZED HEALTH CARE EDUCATION/TRAINING PROGRAM

## 2023-04-10 PROCEDURE — 17000 DESTRUCT PREMALG LESION: CPT | Mod: S$PBB,,, | Performed by: STUDENT IN AN ORGANIZED HEALTH CARE EDUCATION/TRAINING PROGRAM

## 2023-04-10 PROCEDURE — 99214 OFFICE O/P EST MOD 30 MIN: CPT | Mod: PBBFAC,PO | Performed by: STUDENT IN AN ORGANIZED HEALTH CARE EDUCATION/TRAINING PROGRAM

## 2023-04-10 PROCEDURE — 99203 OFFICE O/P NEW LOW 30 MIN: CPT | Mod: 25,S$PBB,, | Performed by: STUDENT IN AN ORGANIZED HEALTH CARE EDUCATION/TRAINING PROGRAM

## 2023-04-10 PROCEDURE — 99999 PR PBB SHADOW E&M-EST. PATIENT-LVL IV: CPT | Mod: PBBFAC,,, | Performed by: STUDENT IN AN ORGANIZED HEALTH CARE EDUCATION/TRAINING PROGRAM

## 2023-04-10 NOTE — PATIENT INSTRUCTIONS

## 2023-04-10 NOTE — PROGRESS NOTES
Subjective:      Patient ID:  Cali Ho is a 72 y.o. male who presents for   Chief Complaint   Patient presents with    Skin Check     ubsc    Spot     Left cheek, arms     New patient    Here today for an UBSC  C/o dry spot on left cheek that becomes tender at times x 2 years  C/o spots on arms and hands    Has no hx of NMSC  Has no fhx of MM    Has no hx of pre-cancers  Has no seen a dermatologist before    Review of Systems   Constitutional:  Negative for fever, chills and fatigue.   Skin:  Positive for activity-related sunscreen use. Negative for itching, rash, daily sunscreen use and wears hat.   Hematologic/Lymphatic: Does not bruise/bleed easily.     Objective:   Physical Exam   Constitutional: He appears well-developed and well-nourished. No distress.   Neurological: He is alert and oriented to person, place, and time. He is not disoriented.   Psychiatric: He has a normal mood and affect.   Skin:   Areas Examined (abnormalities noted in diagram):   Scalp / Hair Palpated and Inspected  Head / Face Inspection Performed  Neck Inspection Performed  Chest / Axilla Inspection Performed  Abdomen Inspection Performed  Back Inspection Performed  RUE Inspected  LUE Inspection Performed  Nails and Digits Inspection Performed                   Diagram Legend     Erythematous scaling macule/papule c/w actinic keratosis       Vascular papule c/w angioma      Pigmented verrucoid papule/plaque c/w seborrheic keratosis      Yellow umbilicated papule c/w sebaceous hyperplasia      Irregularly shaped tan macule c/w lentigo     1-2 mm smooth white papules consistent with Milia      Movable subcutaneous cyst with punctum c/w epidermal inclusion cyst      Subcutaneous movable cyst c/w pilar cyst      Firm pink to brown papule c/w dermatofibroma      Pedunculated fleshy papule(s) c/w skin tag(s)      Evenly pigmented macule c/w junctional nevus     Mildly variegated pigmented, slightly irregular-bordered macule c/w mildly  atypical nevus      Flesh colored to evenly pigmented papule c/w intradermal nevus       Pink pearly papule/plaque c/w basal cell carcinoma      Erythematous hyperkeratotic cursted plaque c/w SCC      Surgical scar with no sign of skin cancer recurrence      Open and closed comedones      Inflammatory papules and pustules      Verrucoid papule consistent consistent with wart     Erythematous eczematous patches and plaques     Dystrophic onycholytic nail with subungual debris c/w onychomycosis     Umbilicated papule    Erythematous-base heme-crusted tan verrucoid plaque consistent with inflamed seborrheic keratosis     Erythematous Silvery Scaling Plaque c/w Psoriasis     See annotation      Assessment / Plan:        Lentigo  This is a benign hyperpigmented sun induced lesion. Daily sun protection will reduce the number of new lesions. Treatment of these benign lesions are considered cosmetic.    Actinic keratosis  -     Ambulatory referral/consult to Dermatology  Cryosurgery Procedure Note    Verbal consent from the patient is obtained and the patient is aware of the precancerous quality and need for treatment of these lesions. Liquid nitrogen cryosurgery is applied to the 5 actinic keratoses, as detailed in the physical exam, to produce a freeze injury. The patient is aware that blisters may form and is instructed on wound care with gentle cleansing and use of vaseline ointment to keep moist until healed. The patient is supplied a handout on cryosurgery and is instructed to call if lesions do not completely resolve.    Actinic skin damage  Upper body skin examination performed today including at least 9 points as noted in physical examination. No lesions suspicious for malignancy noted.  Patient instructed in importance in daily broad spectrum sun protection of at least spf 30. Mineral sunscreen ingredients preferred (Zinc +/- Titanium) and can be found OTC.   Patient encouraged to wear hat for all outdoor  exposure.   Also discussed sun avoidance and use of protective clothing.    Rushing angioma  This is a benign vascular lesion. Reassurance given. No treatment required.     Seborrheic keratoses  These are benign inherited growths without a malignant potential. Reassurance given to patient. No treatment is necessary.     Benign nevus  Careful dermoscopy evaluation of nevi performed with none identified as needing biopsy today  Monitor for new mole or moles that are becoming bigger, darker, irritated, or developing irregular borders.            1 year  No follow-ups on file.

## 2023-05-11 ENCOUNTER — TELEPHONE (OUTPATIENT)
Dept: FAMILY MEDICINE | Facility: CLINIC | Age: 73
End: 2023-05-11
Payer: MEDICARE

## 2023-05-11 NOTE — TELEPHONE ENCOUNTER
----- Message from Comfort Baker sent at 5/11/2023  8:53 AM CDT -----  Contact: Pt [phone]  Type: Needs Medical Advice  Who Called:  Pt     Best Call Back Number: [phone]    Additional Information: Pt calling to reschedule appt that is on Dea 15. Only avail appts are 7:30 am and pt stated that is to early for him. Pt asking if he can get an afternoon appt. Pls clal back and advise

## 2023-05-16 ENCOUNTER — TELEPHONE (OUTPATIENT)
Dept: FAMILY MEDICINE | Facility: CLINIC | Age: 73
End: 2023-05-16
Payer: MEDICARE

## 2023-05-16 NOTE — TELEPHONE ENCOUNTER
Left message on voicemail advising patient that Stephanie will not be in the office for his scheduled appointment. Please call back to reschedule.

## 2023-05-23 ENCOUNTER — OFFICE VISIT (OUTPATIENT)
Dept: FAMILY MEDICINE | Facility: CLINIC | Age: 73
End: 2023-05-23
Payer: MEDICARE

## 2023-05-23 VITALS
HEART RATE: 76 BPM | SYSTOLIC BLOOD PRESSURE: 130 MMHG | TEMPERATURE: 98 F | WEIGHT: 250.44 LBS | HEIGHT: 67 IN | OXYGEN SATURATION: 95 % | BODY MASS INDEX: 39.31 KG/M2 | DIASTOLIC BLOOD PRESSURE: 74 MMHG

## 2023-05-23 DIAGNOSIS — E11.69 TYPE 2 DIABETES MELLITUS WITH OTHER SPECIFIED COMPLICATION, WITHOUT LONG-TERM CURRENT USE OF INSULIN: Primary | ICD-10-CM

## 2023-05-23 DIAGNOSIS — E78.5 HYPERLIPIDEMIA, UNSPECIFIED HYPERLIPIDEMIA TYPE: ICD-10-CM

## 2023-05-23 PROCEDURE — 99213 OFFICE O/P EST LOW 20 MIN: CPT | Mod: PBBFAC,PO | Performed by: NURSE PRACTITIONER

## 2023-05-23 PROCEDURE — 99214 PR OFFICE/OUTPT VISIT, EST, LEVL IV, 30-39 MIN: ICD-10-PCS | Mod: S$PBB,,, | Performed by: NURSE PRACTITIONER

## 2023-05-23 PROCEDURE — 99214 OFFICE O/P EST MOD 30 MIN: CPT | Mod: S$PBB,,, | Performed by: NURSE PRACTITIONER

## 2023-05-23 PROCEDURE — 99999 PR PBB SHADOW E&M-EST. PATIENT-LVL III: CPT | Mod: PBBFAC,,, | Performed by: NURSE PRACTITIONER

## 2023-05-23 PROCEDURE — 99999 PR PBB SHADOW E&M-EST. PATIENT-LVL III: ICD-10-PCS | Mod: PBBFAC,,, | Performed by: NURSE PRACTITIONER

## 2023-05-23 RX ORDER — FLUCONAZOLE 150 MG/1
150 TABLET ORAL WEEKLY
Qty: 12 TABLET | Refills: 1 | Status: SHIPPED | OUTPATIENT
Start: 2023-05-23 | End: 2023-05-26 | Stop reason: SDUPTHER

## 2023-05-23 RX ORDER — EFINACONAZOLE 100 MG/ML
SOLUTION TOPICAL
Qty: 8 ML | Refills: 1 | Status: SHIPPED | OUTPATIENT
Start: 2023-05-23 | End: 2023-05-26 | Stop reason: SDUPTHER

## 2023-05-23 NOTE — PROGRESS NOTES
This dictation has been generated using Modal Fluency Dictation some phonetic errors may occur. Please contact author for clarification if needed.     Problem List Items Addressed This Visit       Type 2 diabetes mellitus with other specified complication - Primary    Relevant Orders    Creatinine, Random Urine    Hyperlipidemia    Overview     Current statin therapy              Orders Placed This Encounter    Creatinine, Random Urine    fluconazole (DIFLUCAN) 150 MG Tab    efinaconazole (JUBLIA) 10 % Albino       Diabetes suboptimal control. A1c 6.7 on last labs.  Due for labs t6 months.   Obesity recommend diet exercise wt loss  HLP Controlled. continue statin.   Skin lesion face refer derm.   Toe nail fungus. Diflucan weekly. Jublia nightly. 3 months send pictures.     Follow up in about 6 months (around 11/23/2023).    ________________________________________________________________  ________________________________________________________________      Chief Complaint   Patient presents with    Follow-up     History of present illness  This 73 y.o. presents today for complaint of following up on DM HLP, and HTN. Nails with nail fungus. No other complaints.   LOV diabetes and hypertension.  Did not have lab orders prior to visit.  Notes home blood sugar readings and blood pressure fairly normal.  No stated complaints.  No BPH symptoms.   LOV 12/2022 physical.  Patient has diabetes obesity hyperlipidemia.  He is concerned about a spot on his face.  LOV diabetes follow-up.  Had recent annual wellness visit.  Reviewed his A1c CMP and lipids with him.  They were acceptable.  Patient denies any side effects to meds.  He does remain active.  He does ask for a couple of refills.  No other complaints.  Health maintenance is up-to-date.        Past Medical History:   Diagnosis Date    Erectile dysfunction     Hyperlipidemia     Type 2 diabetes mellitus        Past Surgical History:   Procedure Laterality Date    HEMORRHOID  SURGERY         Family History   Problem Relation Age of Onset    Glaucoma Mother     Glaucoma Father     Melanoma Father        Social History     Socioeconomic History    Marital status:    Tobacco Use    Smoking status: Never    Smokeless tobacco: Never   Substance and Sexual Activity    Alcohol use: Yes     Alcohol/week: 2.0 standard drinks     Types: 2 Cans of beer per week       Current Outpatient Medications   Medication Sig Dispense Refill    atorvastatin (LIPITOR) 10 MG tablet TAKE 1 TABLET BY MOUTH EVERY DAY IN THE EVENING 90 tablet 1    blood sugar diagnostic (TRUE METRIX GLUCOSE TEST STRIP) Strp TO CHECK BG 1 TIMES DAILY, TO USE WITH INSURANCE PREFERRED METER 100 strip 11    blood-glucose meter (TRUE METRIX AIR GLUCOSE METER) Misc TO CHECK BG 1 TIMES DAILY, TO USE WITH INSURANCE PREFERRED METER 1 each 0    CASCARA SAGRADA ORAL Take 450 mg by mouth.      diclofenac (VOLTAREN) 50 MG EC tablet TAKE 1 TABLET BY MOUTH TWICE A DAY 60 tablet 2    esomeprazole (NEXIUM) 20 MG capsule Take 40 mg by mouth.      fexofenadine (ALLEGRA) 180 MG tablet Take 180 mg by mouth.      lancets 33 gauge Misc TO CHECK BG 1 TIMES DAILY, TO USE WITH INSURANCE PREFERRED METER 100 each 11    lysine (L-LYSINE) 500 mg Tab Take 500 mg by mouth.      melatonin 1 mg Tab Take 1 mg by mouth.      metFORMIN (GLUCOPHAGE) 500 MG tablet TAKE 2 TABLETS BY MOUTH TWICE A DAY WITH MEALS 360 tablet 1    multivitamin capsule Take 1 capsule by mouth once daily.      psyllium husk 0.4 gram Cap Take by mouth.      saw palmetto fruit 450 mg Cap Take 3 tablets by mouth every evening.      traMADoL (ULTRAM) 50 mg tablet Take 50 mg by mouth every 6 (six) hours as needed for Pain.      efinaconazole (JUBLIA) 10 % Albino Apply topically nightly to effected toe nail for 12 weeks. 8 mL 1    fluconazole (DIFLUCAN) 150 MG Tab Take 1 tablet (150 mg total) by mouth once a week. 12 tablet 1     No current facility-administered medications for this visit.        Review of patient's allergies indicates:   Allergen Reactions    Dilaudid [hydromorphone]        Physical examination  Vitals Reviewed\  Vitals:    05/23/23 1134   BP: 130/74   Pulse: 76   Temp: 97.6 °F (36.4 °C)     Body mass index is 39.22 kg/m².   Weight: 113.6 kg (250 lb 7.1 oz)    Gen. Well-dressed well-nourished   Skin warm dry and intact.  No rashes noted.    Neck is supple without adenopathy  Chest.  Respirations are even unlabored.  Lungs are clear to auscultation.  Cardiac regular rate and rhythm.  No chest wall adenopathy noted.  Neuro. Awake alert oriented x4.  Normal judgment and cognition noted.  Extremities no clubbing cyanosis or edema noted.   Protective Sensation (w/ 10 gram monofilament):  Right: Intact  Left: Intact    Visual Inspection:  Onychomycosis -  Bilateral    Pedal Pulses:   Right: Present  Left: Present    Posterior Tibialis Pulses:   Right:Present  Left: Present   Call or return to clinic prn if these symptoms worsen or fail to improve as anticipated.

## 2023-05-23 NOTE — PATIENT INSTRUCTIONS
[facility] as the next option. [address]    Send me picture of your nails from today and pictures in 3 months. Send both set of pictures in 3 months and we will decide further treatment.

## 2023-05-26 RX ORDER — EFINACONAZOLE 100 MG/ML
SOLUTION TOPICAL
Qty: 8 ML | Refills: 1 | Status: SHIPPED | OUTPATIENT
Start: 2023-05-26 | End: 2023-09-14 | Stop reason: SDUPTHER

## 2023-05-26 RX ORDER — FLUCONAZOLE 150 MG/1
150 TABLET ORAL WEEKLY
Qty: 12 TABLET | Refills: 1 | Status: SHIPPED | OUTPATIENT
Start: 2023-05-26 | End: 2023-09-14 | Stop reason: SDUPTHER

## 2023-05-26 NOTE — TELEPHONE ENCOUNTER
Please see patient portal message   Medication pended.   Please send to pharmacy you recommended   Patient cannot remember the name  Please advise

## 2023-05-26 NOTE — TELEPHONE ENCOUNTER
----- Message from Saima Tomlinson sent at 5/26/2023  9:24 AM CDT -----  Contact: Patient  Type:  RX Refill Request    Who Called:   Patient  Refill or New Rx:   Refill    RX NAMES:  efinaconazole (JUBLIA) 10 % Albino  fluconazole (DIFLUCAN) 150 MG Tab    Preferred Pharmacy with phone number:  Local or Mail Order:  Local  Ordering Provider:  Dr Hull    Would the patient rather a call back or a response via [facility]?   Call back  Best Call Back Number:  [phone]    Additional Information:   States Dr Hull told him if he had a problem filling these prescriptions at [facility] to let Dr Hull know and he would sent to another pharmacy - states co-pay was too high at [facility] - please call to advise - thank you

## 2023-06-05 RX ORDER — DICLOFENAC SODIUM 50 MG/1
TABLET, DELAYED RELEASE ORAL
Qty: 60 TABLET | Refills: 2 | Status: SHIPPED | OUTPATIENT
Start: 2023-06-05 | End: 2023-09-14 | Stop reason: SDUPTHER

## 2023-06-05 NOTE — TELEPHONE ENCOUNTER
Refill Routing Note   Medication(s) are not appropriate for processing by [facility] for the following reason(s):      Medication outside of protocol  Non-participating provider    [facility] action(s):  Route Care Due:  None identified          Appointments  past 12m or future 3m with PCP    Date Provider   Last Visit   5/23/2023 Jaron Hull NP   Next Visit   11/22/2023 Jaron Hull NP   ED visits in past 90 days: 0        Note composed:8:35 AM 06/05/2023

## 2023-08-15 RX ORDER — METFORMIN HYDROCHLORIDE 500 MG/1
TABLET ORAL
Qty: 360 TABLET | Refills: 1 | Status: SHIPPED | OUTPATIENT
Start: 2023-08-15 | End: 2023-09-14 | Stop reason: SDUPTHER

## 2023-09-14 ENCOUNTER — LAB VISIT (OUTPATIENT)
Dept: LAB | Facility: HOSPITAL | Age: 73
End: 2023-09-14
Attending: NURSE PRACTITIONER
Payer: MEDICARE

## 2023-09-14 ENCOUNTER — OFFICE VISIT (OUTPATIENT)
Dept: FAMILY MEDICINE | Facility: CLINIC | Age: 73
End: 2023-09-14
Payer: MEDICARE

## 2023-09-14 VITALS
HEIGHT: 67 IN | BODY MASS INDEX: 38.62 KG/M2 | OXYGEN SATURATION: 93 % | TEMPERATURE: 98 F | HEART RATE: 89 BPM | WEIGHT: 246.06 LBS | SYSTOLIC BLOOD PRESSURE: 136 MMHG | DIASTOLIC BLOOD PRESSURE: 84 MMHG

## 2023-09-14 DIAGNOSIS — E11.69 TYPE 2 DIABETES MELLITUS WITH OTHER SPECIFIED COMPLICATION, WITHOUT LONG-TERM CURRENT USE OF INSULIN: Primary | ICD-10-CM

## 2023-09-14 DIAGNOSIS — E78.5 HYPERLIPIDEMIA, UNSPECIFIED HYPERLIPIDEMIA TYPE: ICD-10-CM

## 2023-09-14 DIAGNOSIS — N40.0 BENIGN PROSTATIC HYPERPLASIA, UNSPECIFIED WHETHER LOWER URINARY TRACT SYMPTOMS PRESENT: ICD-10-CM

## 2023-09-14 DIAGNOSIS — E11.69 TYPE 2 DIABETES MELLITUS WITH OTHER SPECIFIED COMPLICATION, WITHOUT LONG-TERM CURRENT USE OF INSULIN: ICD-10-CM

## 2023-09-14 DIAGNOSIS — E66.01 SEVERE OBESITY (BMI 35.0-39.9) WITH COMORBIDITY: ICD-10-CM

## 2023-09-14 PROCEDURE — 36415 COLL VENOUS BLD VENIPUNCTURE: CPT | Mod: PO | Performed by: NURSE PRACTITIONER

## 2023-09-14 PROCEDURE — 80053 COMPREHEN METABOLIC PANEL: CPT | Performed by: NURSE PRACTITIONER

## 2023-09-14 PROCEDURE — 99999 PR PBB SHADOW E&M-EST. PATIENT-LVL III: CPT | Mod: PBBFAC,,, | Performed by: NURSE PRACTITIONER

## 2023-09-14 PROCEDURE — 80061 LIPID PANEL: CPT | Performed by: NURSE PRACTITIONER

## 2023-09-14 PROCEDURE — 99213 OFFICE O/P EST LOW 20 MIN: CPT | Mod: PBBFAC,PO | Performed by: NURSE PRACTITIONER

## 2023-09-14 PROCEDURE — 99999 PR PBB SHADOW E&M-EST. PATIENT-LVL III: ICD-10-PCS | Mod: PBBFAC,,, | Performed by: NURSE PRACTITIONER

## 2023-09-14 PROCEDURE — 99214 PR OFFICE/OUTPT VISIT, EST, LEVL IV, 30-39 MIN: ICD-10-PCS | Mod: S$PBB,,, | Performed by: NURSE PRACTITIONER

## 2023-09-14 PROCEDURE — 99214 OFFICE O/P EST MOD 30 MIN: CPT | Mod: S$PBB,,, | Performed by: NURSE PRACTITIONER

## 2023-09-14 RX ORDER — ATORVASTATIN CALCIUM 10 MG/1
10 TABLET, FILM COATED ORAL NIGHTLY
Qty: 90 TABLET | Refills: 1 | Status: SHIPPED | OUTPATIENT
Start: 2023-09-14

## 2023-09-14 RX ORDER — HYDROGEN PEROXIDE 3 %
40 SOLUTION, NON-ORAL MISCELLANEOUS
Qty: 90 CAPSULE | Refills: 3 | Status: SHIPPED | OUTPATIENT
Start: 2023-09-14 | End: 2023-11-16

## 2023-09-14 RX ORDER — INSULIN PUMP SYRINGE, 3 ML
EACH MISCELLANEOUS
Qty: 1 EACH | Refills: 0 | Status: SHIPPED | OUTPATIENT
Start: 2023-09-14 | End: 2024-09-13

## 2023-09-14 RX ORDER — LANCETS
EACH MISCELLANEOUS
Qty: 100 EACH | Refills: 11 | Status: SHIPPED | OUTPATIENT
Start: 2023-09-14

## 2023-09-14 RX ORDER — FLUCONAZOLE 150 MG/1
150 TABLET ORAL WEEKLY
Qty: 12 TABLET | Refills: 1 | Status: SHIPPED | OUTPATIENT
Start: 2023-09-14

## 2023-09-14 RX ORDER — EFINACONAZOLE 100 MG/ML
SOLUTION TOPICAL
Qty: 8 ML | Refills: 1 | Status: SHIPPED | OUTPATIENT
Start: 2023-09-14 | End: 2023-11-16 | Stop reason: SDUPTHER

## 2023-09-14 RX ORDER — METFORMIN HYDROCHLORIDE 500 MG/1
1000 TABLET ORAL 2 TIMES DAILY WITH MEALS
Qty: 360 TABLET | Refills: 1 | Status: SHIPPED | OUTPATIENT
Start: 2023-09-14

## 2023-09-14 RX ORDER — DICLOFENAC SODIUM 50 MG/1
50 TABLET, DELAYED RELEASE ORAL 2 TIMES DAILY
Qty: 60 TABLET | Refills: 2 | Status: SHIPPED | OUTPATIENT
Start: 2023-09-14 | End: 2023-12-28

## 2023-09-14 NOTE — PROGRESS NOTES
This dictation has been generated using Modal Fluency Dictation some phonetic errors may occur. Please contact author for clarification if needed.     Problem List Items Addressed This Visit       BPH (benign prostatic hyperplasia)    Type 2 diabetes mellitus with other specified complication - Primary    Relevant Medications    metFORMIN (GLUCOPHAGE) 500 MG tablet    Other Relevant Orders    Lipid Panel    Comprehensive Metabolic Panel    Severe obesity (BMI 35.0-39.9) with comorbidity    Hyperlipidemia    Overview     Current statin therapy            Orders Placed This Encounter    Lipid Panel    Comprehensive Metabolic Panel    efinaconazole (JUBLIA) 10 % Albino    atorvastatin (LIPITOR) 10 MG tablet    esomeprazole (NEXIUM) 20 MG capsule    fluconazole (DIFLUCAN) 150 MG Tab    metFORMIN (GLUCOPHAGE) 500 MG tablet    diclofenac (VOLTAREN) 50 MG EC tablet    blood-glucose meter kit    lancets Misc    blood sugar diagnostic Strp    blood-glucose meter kit         Diabetes suboptimal control.  Transfer labs to new pharmacy.  Update A1c and lipid panel.  CMP.   Obesity recommend diet exercise wt loss  HLP Controlled. continue statin.   Skin lesion face refer derm.   Toe nail fungus. Diflucan weekly. Jublia nightly. Did not get Jublia. Sending to [facility]  Called pt. A1c added.     No follow-ups on file.    ________________________________________________________________  ________________________________________________________________      No chief complaint on file.    History of present illness  This 73 y.o. presents today for complaint of following up on DM HLP, and HTN. Nails with nail fungus slowly improving. Did not get Jublia. No other complaints.   LOV diabetes and hypertension.  Did not have lab orders prior to visit.  Notes home blood sugar readings and blood pressure fairly normal.  No stated complaints.  No BPH symptoms.   LOV 12/2022 physical.  Patient has diabetes obesity hyperlipidemia.  He is concerned  about a spot on his face.  LOV diabetes follow-up.  Had recent annual wellness visit.  Reviewed his A1c CMP and lipids with him.  They were acceptable.  Patient denies any side effects to meds.  He does remain active.  He does ask for a couple of refills.  No other complaints.  Health maintenance is up-to-date.        Past Medical History:   Diagnosis Date    Erectile dysfunction     Hyperlipidemia     Type 2 diabetes mellitus        Past Surgical History:   Procedure Laterality Date    HEMORRHOID SURGERY         Family History   Problem Relation Age of Onset    Glaucoma Mother     Glaucoma Father     Melanoma Father        Social History     Socioeconomic History    Marital status:    Tobacco Use    Smoking status: Never    Smokeless tobacco: Never   Substance and Sexual Activity    Alcohol use: Yes     Alcohol/week: 2.0 standard drinks of alcohol     Types: 2 Cans of beer per week       Current Outpatient Medications   Medication Sig Dispense Refill    blood sugar diagnostic (TRUE METRIX GLUCOSE TEST STRIP) Strp TO CHECK BG 1 TIMES DAILY, TO USE WITH INSURANCE PREFERRED METER 100 strip 11    blood-glucose meter (TRUE METRIX AIR GLUCOSE METER) Misc TO CHECK BG 1 TIMES DAILY, TO USE WITH INSURANCE PREFERRED METER 1 each 0    CASCARA SAGRADA ORAL Take 450 mg by mouth.      fexofenadine (ALLEGRA) 180 MG tablet Take 180 mg by mouth.      lancets 33 gauge Misc TO CHECK BG 1 TIMES DAILY, TO USE WITH INSURANCE PREFERRED METER 100 each 11    lysine (L-LYSINE) 500 mg Tab Take 500 mg by mouth.      melatonin 1 mg Tab Take 1 mg by mouth.      multivitamin capsule Take 1 capsule by mouth once daily.      psyllium husk 0.4 gram Cap Take by mouth.      saw palmetto fruit 450 mg Cap Take 3 tablets by mouth every evening.      traMADoL (ULTRAM) 50 mg tablet Take 50 mg by mouth every 6 (six) hours as needed for Pain.      atorvastatin (LIPITOR) 10 MG tablet Take 1 tablet (10 mg total) by mouth every evening. 90 tablet 1     blood sugar diagnostic Strp To check BG 2 times daily, to use with insurance preferred meter 100 strip 11    blood-glucose meter kit To check BG 2 times daily, to use with insurance preferred meter 1 each 0    blood-glucose meter kit Use as instructed 1 each 0    diclofenac (VOLTAREN) 50 MG EC tablet Take 1 tablet (50 mg total) by mouth 2 (two) times daily. 60 tablet 2    efinaconazole (JUBLIA) 10 % Albino Apply topically nightly to effected toe nail for 12 weeks. 8 mL 1    esomeprazole (NEXIUM) 20 MG capsule Take 2 capsules (40 mg total) by mouth before breakfast. 90 capsule 3    fluconazole (DIFLUCAN) 150 MG Tab Take 1 tablet (150 mg total) by mouth once a week. 12 tablet 1    lancets Misc To check BG 2 times daily, to use with insurance preferred meter 100 each 11    metFORMIN (GLUCOPHAGE) 500 MG tablet Take 2 tablets (1,000 mg total) by mouth 2 (two) times daily with meals. 360 tablet 1     No current facility-administered medications for this visit.       Review of patient's allergies indicates:   Allergen Reactions    Dilaudid [hydromorphone]        Physical examination  Vitals Reviewed\  Vitals:    09/14/23 1406   BP: 136/84   Pulse: 89   Temp: 97.6 °F (36.4 °C)     Body mass index is 38.53 kg/m².   Weight: 111.6 kg (246 lb 0.5 oz)    Gen. Well-dressed well-nourished   Skin warm dry and intact.  No rashes noted.    Neck is supple without adenopathy  Chest.  Respirations are even unlabored.  Lungs are clear to auscultation.  Cardiac regular rate and rhythm.  No chest wall adenopathy noted.  Neuro. Awake alert oriented x4.  Normal judgment and cognition noted.  Extremities no clubbing cyanosis or edema noted.     Call or return to clinic prn if these symptoms worsen or fail to improve as anticipated.

## 2023-09-15 LAB
ALBUMIN SERPL BCP-MCNC: 4.2 G/DL (ref 3.5–5.2)
ALP SERPL-CCNC: 82 U/L (ref 55–135)
ALT SERPL W/O P-5'-P-CCNC: 21 U/L (ref 10–44)
ANION GAP SERPL CALC-SCNC: 10 MMOL/L (ref 8–16)
AST SERPL-CCNC: 21 U/L (ref 10–40)
BILIRUB SERPL-MCNC: 0.5 MG/DL (ref 0.1–1)
BUN SERPL-MCNC: 19 MG/DL (ref 8–23)
CALCIUM SERPL-MCNC: 9.6 MG/DL (ref 8.7–10.5)
CHLORIDE SERPL-SCNC: 104 MMOL/L (ref 95–110)
CHOLEST SERPL-MCNC: 130 MG/DL (ref 120–199)
CHOLEST/HDLC SERPL: 3.9 {RATIO} (ref 2–5)
CO2 SERPL-SCNC: 24 MMOL/L (ref 23–29)
CREAT SERPL-MCNC: 1 MG/DL (ref 0.5–1.4)
EST. GFR  (NO RACE VARIABLE): >60 ML/MIN/1.73 M^2
GLUCOSE SERPL-MCNC: 94 MG/DL (ref 70–110)
HDLC SERPL-MCNC: 33 MG/DL (ref 40–75)
HDLC SERPL: 25.4 % (ref 20–50)
LDLC SERPL CALC-MCNC: 64.2 MG/DL (ref 63–159)
NONHDLC SERPL-MCNC: 97 MG/DL
POTASSIUM SERPL-SCNC: 4.1 MMOL/L (ref 3.5–5.1)
PROT SERPL-MCNC: 7.1 G/DL (ref 6–8.4)
SODIUM SERPL-SCNC: 138 MMOL/L (ref 136–145)
TRIGL SERPL-MCNC: 164 MG/DL (ref 30–150)

## 2023-10-02 ENCOUNTER — CLINICAL SUPPORT (OUTPATIENT)
Dept: OPHTHALMOLOGY | Facility: CLINIC | Age: 73
End: 2023-10-02
Payer: MEDICARE

## 2023-10-02 DIAGNOSIS — H40.013 OAG (OPEN ANGLE GLAUCOMA) SUSPECT, LOW RISK, BILATERAL: ICD-10-CM

## 2023-10-04 ENCOUNTER — PATIENT MESSAGE (OUTPATIENT)
Dept: ADMINISTRATIVE | Facility: OTHER | Age: 73
End: 2023-10-04
Payer: MEDICARE

## 2023-10-13 ENCOUNTER — PATIENT MESSAGE (OUTPATIENT)
Dept: ADMINISTRATIVE | Facility: OTHER | Age: 73
End: 2023-10-13
Payer: MEDICARE

## 2023-10-17 ENCOUNTER — OFFICE VISIT (OUTPATIENT)
Dept: OPHTHALMOLOGY | Facility: CLINIC | Age: 73
End: 2023-10-17
Payer: MEDICARE

## 2023-10-17 DIAGNOSIS — H40.013 OAG (OPEN ANGLE GLAUCOMA) SUSPECT, LOW RISK, BILATERAL: Primary | ICD-10-CM

## 2023-10-17 DIAGNOSIS — H25.13 NUCLEAR SCLEROTIC CATARACT, BILATERAL: ICD-10-CM

## 2023-10-17 PROCEDURE — 99999 PR PBB SHADOW E&M-EST. PATIENT-LVL III: ICD-10-PCS | Mod: PBBFAC,,, | Performed by: OPHTHALMOLOGY

## 2023-10-17 PROCEDURE — 92020 GONIOSCOPY: CPT | Mod: PBBFAC,PO | Performed by: OPHTHALMOLOGY

## 2023-10-17 PROCEDURE — 92020 GONIOSCOPY: CPT | Mod: S$PBB,,, | Performed by: OPHTHALMOLOGY

## 2023-10-17 PROCEDURE — 92020 PR SPECIAL EYE EVAL,GONIOSCOPY: ICD-10-PCS | Mod: S$PBB,,, | Performed by: OPHTHALMOLOGY

## 2023-10-17 PROCEDURE — 99213 OFFICE O/P EST LOW 20 MIN: CPT | Mod: PBBFAC,PO | Performed by: OPHTHALMOLOGY

## 2023-10-17 PROCEDURE — 99213 OFFICE O/P EST LOW 20 MIN: CPT | Mod: S$PBB,,, | Performed by: OPHTHALMOLOGY

## 2023-10-17 PROCEDURE — 99999 PR PBB SHADOW E&M-EST. PATIENT-LVL III: CPT | Mod: PBBFAC,,, | Performed by: OPHTHALMOLOGY

## 2023-10-17 PROCEDURE — 99213 PR OFFICE/OUTPT VISIT, EST, LEVL III, 20-29 MIN: ICD-10-PCS | Mod: S$PBB,,, | Performed by: OPHTHALMOLOGY

## 2023-11-07 ENCOUNTER — TELEPHONE (OUTPATIENT)
Dept: FAMILY MEDICINE | Facility: CLINIC | Age: 73
End: 2023-11-07
Payer: MEDICARE

## 2023-11-07 DIAGNOSIS — E11.69 TYPE 2 DIABETES MELLITUS WITH OTHER SPECIFIED COMPLICATION, WITHOUT LONG-TERM CURRENT USE OF INSULIN: Primary | ICD-10-CM

## 2023-11-07 NOTE — TELEPHONE ENCOUNTER
----- Message from Ladarius Lamas sent at 11/7/2023  8:48 AM CST -----  Name Of Caller:  Cali         Provider Name: Jaron Hull         Does patient feel the need to be seen today? no        Relationship to the Pt?: patient        Contact Preference?:  [phone]        What is the nature of the call?:   Patient has an appointment scheduled for Thursday 11- at 3pm, patient would like for his lab & urine orders to be placed in the system so that he can get those completed prior to his appointment.

## 2023-11-07 NOTE — TELEPHONE ENCOUNTER
Called pt, no answer. Message left for pt to call our clinic.  Pt needs lab work & urine done prior to appt.

## 2023-11-09 ENCOUNTER — PATIENT MESSAGE (OUTPATIENT)
Dept: ADMINISTRATIVE | Facility: OTHER | Age: 73
End: 2023-11-09
Payer: MEDICARE

## 2023-11-09 ENCOUNTER — PATIENT MESSAGE (OUTPATIENT)
Dept: OTHER | Facility: OTHER | Age: 73
End: 2023-11-09
Payer: MEDICARE

## 2023-11-13 ENCOUNTER — PATIENT MESSAGE (OUTPATIENT)
Dept: ADMINISTRATIVE | Facility: HOSPITAL | Age: 73
End: 2023-11-13
Payer: MEDICARE

## 2023-11-16 ENCOUNTER — OFFICE VISIT (OUTPATIENT)
Dept: FAMILY MEDICINE | Facility: CLINIC | Age: 73
End: 2023-11-16
Payer: MEDICARE

## 2023-11-16 VITALS
HEIGHT: 67 IN | DIASTOLIC BLOOD PRESSURE: 84 MMHG | WEIGHT: 246.69 LBS | HEART RATE: 92 BPM | BODY MASS INDEX: 38.72 KG/M2 | OXYGEN SATURATION: 92 % | SYSTOLIC BLOOD PRESSURE: 136 MMHG

## 2023-11-16 DIAGNOSIS — Z23 NEED FOR VACCINATION: ICD-10-CM

## 2023-11-16 DIAGNOSIS — E11.69 TYPE 2 DIABETES MELLITUS WITH OTHER SPECIFIED COMPLICATION, WITHOUT LONG-TERM CURRENT USE OF INSULIN: ICD-10-CM

## 2023-11-16 DIAGNOSIS — E66.01 SEVERE OBESITY (BMI 35.0-39.9) WITH COMORBIDITY: ICD-10-CM

## 2023-11-16 DIAGNOSIS — Z00.00 ENCOUNTER FOR PREVENTIVE HEALTH EXAMINATION: Primary | ICD-10-CM

## 2023-11-16 DIAGNOSIS — E78.5 HYPERLIPIDEMIA, UNSPECIFIED HYPERLIPIDEMIA TYPE: ICD-10-CM

## 2023-11-16 DIAGNOSIS — Z12.5 ENCOUNTER FOR SCREENING FOR MALIGNANT NEOPLASM OF PROSTATE: ICD-10-CM

## 2023-11-16 PROCEDURE — 99214 OFFICE O/P EST MOD 30 MIN: CPT | Mod: S$PBB,,, | Performed by: NURSE PRACTITIONER

## 2023-11-16 PROCEDURE — G0008 ADMIN INFLUENZA VIRUS VAC: HCPCS | Mod: PBBFAC,PO

## 2023-11-16 PROCEDURE — 99999PBSHW FLU VACCINE - QUADRIVALENT - ADJUVANTED: ICD-10-PCS | Mod: PBBFAC,,,

## 2023-11-16 PROCEDURE — 99999 PR PBB SHADOW E&M-EST. PATIENT-LVL III: CPT | Mod: PBBFAC,,, | Performed by: NURSE PRACTITIONER

## 2023-11-16 PROCEDURE — 99214 PR OFFICE/OUTPT VISIT, EST, LEVL IV, 30-39 MIN: ICD-10-PCS | Mod: S$PBB,,, | Performed by: NURSE PRACTITIONER

## 2023-11-16 PROCEDURE — 99213 OFFICE O/P EST LOW 20 MIN: CPT | Mod: PBBFAC,PO | Performed by: NURSE PRACTITIONER

## 2023-11-16 PROCEDURE — 99999 PR PBB SHADOW E&M-EST. PATIENT-LVL III: ICD-10-PCS | Mod: PBBFAC,,, | Performed by: NURSE PRACTITIONER

## 2023-11-16 PROCEDURE — 99999PBSHW FLU VACCINE - QUADRIVALENT - ADJUVANTED: Mod: PBBFAC,,,

## 2023-11-16 RX ORDER — EFINACONAZOLE 100 MG/ML
SOLUTION TOPICAL
Qty: 8 ML | Refills: 1 | Status: SHIPPED | OUTPATIENT
Start: 2023-11-16

## 2023-11-16 RX ORDER — ESOMEPRAZOLE MAGNESIUM 40 MG/1
40 CAPSULE, DELAYED RELEASE ORAL
Qty: 30 CAPSULE | Refills: 11 | Status: SHIPPED | OUTPATIENT
Start: 2023-11-16 | End: 2024-11-15

## 2023-11-16 NOTE — PROGRESS NOTES
This dictation has been generated using Modal Fluency Dictation some phonetic errors may occur. Please contact author for clarification if needed.     Problem List Items Addressed This Visit       Type 2 diabetes mellitus with other specified complication    Relevant Orders    Comprehensive Metabolic Panel    Hemoglobin A1C    Lipid Panel    TSH    Severe obesity (BMI 35.0-39.9) with comorbidity    Hyperlipidemia    Overview     Current statin therapy          Other Visit Diagnoses       Encounter for preventive health examination    -  Primary    Encounter for screening for malignant neoplasm of prostate        Need for vaccination        Relevant Orders    Influenza - Quadrivalent (Adjuvanted) (Completed)            Orders Placed This Encounter    Influenza - Quadrivalent (Adjuvanted)    Comprehensive Metabolic Panel    Hemoglobin A1C    Lipid Panel    TSH    esomeprazole (NEXIUM) 40 MG capsule         Diabetes suboptimal control.  Update A1c and lipid panel cmp tsh before next visit.  FLU shot.    Obesity recommend diet exercise wt loss  HLP Controlled. continue statin.   Toe nail fungus. Diflucan weekly. Jublia nightly. Did not get Jublia. Sending to retail pharmacy drug rx.     Follow up in about 6 months (around 5/16/2024).    ________________________________________________________________  ________________________________________________________________      Chief Complaint   Patient presents with    Annual Exam     History of present illness  This 73 y.o. presents today for complaint of following up on DM HLP, and HTN. Nails with nail fungus slowly improving. Did not get Jublia from [facility] cost. Want retail pharmacy to try. No other complaints.   LOV diabetes and hypertension.  Did not have lab orders prior to visit.  Notes home blood sugar readings and blood pressure fairly normal.  No stated complaints.  No BPH symptoms.   LOV 12/2022 physical.  Patient has diabetes obesity hyperlipidemia.  He is  concerned about a spot on his face.  LOV diabetes follow-up.  Had recent annual wellness visit.  Reviewed his A1c CMP and lipids with him.  They were acceptable.  Patient denies any side effects to meds.  He does remain active.  He does ask for a couple of refills.  No other complaints.  Health maintenance is up-to-date.    Past Medical History:   Diagnosis Date    Erectile dysfunction     Hyperlipidemia     Type 2 diabetes mellitus        Past Surgical History:   Procedure Laterality Date    HEMORRHOID SURGERY         Family History   Problem Relation Age of Onset    Glaucoma Mother     Glaucoma Father     Melanoma Father        Social History     Socioeconomic History    Marital status:    Tobacco Use    Smoking status: Never    Smokeless tobacco: Never   Substance and Sexual Activity    Alcohol use: Yes     Alcohol/week: 2.0 standard drinks of alcohol     Types: 2 Cans of beer per week       Current Outpatient Medications   Medication Sig Dispense Refill    atorvastatin (LIPITOR) 10 MG tablet Take 1 tablet (10 mg total) by mouth every evening. 90 tablet 1    blood sugar diagnostic (TRUE METRIX GLUCOSE TEST STRIP) Strp TO CHECK BG 1 TIMES DAILY, TO USE WITH INSURANCE PREFERRED METER 100 strip 11    blood sugar diagnostic Strp To check BG 2 times daily, to use with insurance preferred meter 100 strip 11    blood-glucose meter (TRUE METRIX AIR GLUCOSE METER) [facility] BG 1 TIMES DAILY, TO USE WITH INSURANCE PREFERRED METER 1 each 0    blood-glucose meter kit To check BG 2 times daily, to use with insurance preferred meter 1 each 0    blood-glucose meter kit Use as instructed 1 each 0    CASCARA SAGRADA ORAL Take 450 mg by mouth.      diclofenac (VOLTAREN) 50 MG EC tablet Take 1 tablet (50 mg total) by mouth 2 (two) times daily. 60 tablet 2    fexofenadine (ALLEGRA) 180 MG tablet Take 180 mg by mouth.      fluconazole (DIFLUCAN) 150 MG Tab Take 1 tablet (150 mg total) by mouth once a week. 12 tablet 1     lancets 33 gauge Misc TO CHECK BG 1 TIMES DAILY, TO USE WITH INSURANCE PREFERRED METER 100 each 11    lancets Misc To check BG 2 times daily, to use with insurance preferred meter 100 each 11    lysine (L-LYSINE) 500 mg Tab Take 500 mg by mouth.      melatonin 1 mg Tab Take 1 mg by mouth.      metFORMIN (GLUCOPHAGE) 500 MG tablet Take 2 tablets (1,000 mg total) by mouth 2 (two) times daily with meals. 360 tablet 1    multivitamin capsule Take 1 capsule by mouth once daily.      psyllium husk 0.4 gram Cap Take by mouth.      saw palmetto fruit 450 mg Cap Take 3 tablets by mouth every evening.      traMADoL (ULTRAM) 50 mg tablet Take 50 mg by mouth every 6 (six) hours as needed for Pain.      efinaconazole (JUBLIA) 10 % Albino Apply topically nightly to effected toe nail for 12 weeks. 8 mL 1    esomeprazole (NEXIUM) 40 MG capsule Take 1 capsule (40 mg total) by mouth before breakfast. 30 capsule 11     No current facility-administered medications for this visit.       Review of patient's allergies indicates:   Allergen Reactions    Dilaudid [hydromorphone]        Physical examination  Vitals Reviewed\  Vitals:    11/16/23 1444   BP: 136/84   Pulse: 92     Body mass index is 38.64 kg/m².   Weight: 111.9 kg (246 lb 11.1 oz)    Gen. Well-dressed well-nourished   Skin warm dry and intact.  No rashes noted.    Neck is supple without adenopathy  Chest.  Respirations are even unlabored.  Lungs are clear to auscultation.  Cardiac regular rate and rhythm.  No chest wall adenopathy noted.  Neuro. Awake alert oriented x4.  Normal judgment and cognition noted.  Extremities no clubbing cyanosis or edema noted.     Call or return to clinic prn if these symptoms worsen or fail to improve as anticipated.

## 2023-12-28 RX ORDER — DICLOFENAC SODIUM 50 MG/1
50 TABLET, DELAYED RELEASE ORAL 2 TIMES DAILY
Qty: 60 TABLET | Refills: 2 | Status: SHIPPED | OUTPATIENT
Start: 2023-12-28 | End: 2024-04-01

## 2024-01-02 ENCOUNTER — PATIENT MESSAGE (OUTPATIENT)
Dept: OTHER | Facility: OTHER | Age: 74
End: 2024-01-02
Payer: MEDICARE

## 2024-01-11 DIAGNOSIS — Z00.00 ENCOUNTER FOR MEDICARE ANNUAL WELLNESS EXAM: ICD-10-CM

## 2024-03-06 ENCOUNTER — PATIENT MESSAGE (OUTPATIENT)
Dept: FAMILY MEDICINE | Facility: CLINIC | Age: 74
End: 2024-03-06
Payer: MEDICARE

## 2024-04-01 RX ORDER — DICLOFENAC SODIUM 50 MG/1
50 TABLET, DELAYED RELEASE ORAL 2 TIMES DAILY
Qty: 60 TABLET | Refills: 2 | Status: SHIPPED | OUTPATIENT
Start: 2024-04-01

## 2024-04-30 ENCOUNTER — PATIENT MESSAGE (OUTPATIENT)
Dept: OTHER | Facility: OTHER | Age: 74
End: 2024-04-30
Payer: MEDICARE

## 2024-05-13 ENCOUNTER — LAB VISIT (OUTPATIENT)
Dept: LAB | Facility: HOSPITAL | Age: 74
End: 2024-05-13
Attending: NURSE PRACTITIONER
Payer: MEDICARE

## 2024-05-13 DIAGNOSIS — E11.69 TYPE 2 DIABETES MELLITUS WITH OTHER SPECIFIED COMPLICATION, WITHOUT LONG-TERM CURRENT USE OF INSULIN: ICD-10-CM

## 2024-05-13 LAB
ALBUMIN SERPL BCP-MCNC: 3.8 G/DL (ref 3.5–5.2)
ALP SERPL-CCNC: 68 U/L (ref 55–135)
ALT SERPL W/O P-5'-P-CCNC: 21 U/L (ref 10–44)
ANION GAP SERPL CALC-SCNC: 9 MMOL/L (ref 8–16)
AST SERPL-CCNC: 21 U/L (ref 10–40)
BILIRUB SERPL-MCNC: 0.5 MG/DL (ref 0.1–1)
BUN SERPL-MCNC: 22 MG/DL (ref 8–23)
CALCIUM SERPL-MCNC: 9.4 MG/DL (ref 8.7–10.5)
CHLORIDE SERPL-SCNC: 109 MMOL/L (ref 95–110)
CHOLEST SERPL-MCNC: 134 MG/DL (ref 120–199)
CHOLEST/HDLC SERPL: 3.7 {RATIO} (ref 2–5)
CO2 SERPL-SCNC: 20 MMOL/L (ref 23–29)
CREAT SERPL-MCNC: 0.9 MG/DL (ref 0.5–1.4)
EST. GFR  (NO RACE VARIABLE): >60 ML/MIN/1.73 M^2
ESTIMATED AVG GLUCOSE: 148 MG/DL (ref 68–131)
GLUCOSE SERPL-MCNC: 129 MG/DL (ref 70–110)
HBA1C MFR BLD: 6.8 % (ref 4–5.6)
HDLC SERPL-MCNC: 36 MG/DL (ref 40–75)
HDLC SERPL: 26.9 % (ref 20–50)
LDLC SERPL CALC-MCNC: 72.6 MG/DL (ref 63–159)
NONHDLC SERPL-MCNC: 98 MG/DL
POTASSIUM SERPL-SCNC: 4.2 MMOL/L (ref 3.5–5.1)
PROT SERPL-MCNC: 6.5 G/DL (ref 6–8.4)
SODIUM SERPL-SCNC: 138 MMOL/L (ref 136–145)
TRIGL SERPL-MCNC: 127 MG/DL (ref 30–150)
TSH SERPL DL<=0.005 MIU/L-ACNC: 0.79 UIU/ML (ref 0.4–4)

## 2024-05-13 PROCEDURE — 36415 COLL VENOUS BLD VENIPUNCTURE: CPT | Mod: PO | Performed by: NURSE PRACTITIONER

## 2024-05-13 PROCEDURE — 80053 COMPREHEN METABOLIC PANEL: CPT | Performed by: NURSE PRACTITIONER

## 2024-05-13 PROCEDURE — 83036 HEMOGLOBIN GLYCOSYLATED A1C: CPT | Performed by: NURSE PRACTITIONER

## 2024-05-13 PROCEDURE — 84443 ASSAY THYROID STIM HORMONE: CPT | Performed by: NURSE PRACTITIONER

## 2024-05-13 PROCEDURE — 80061 LIPID PANEL: CPT | Performed by: NURSE PRACTITIONER

## 2024-05-13 NOTE — TELEPHONE ENCOUNTER
Refill Routing Note   Medication(s) are not appropriate for processing by [facility] for the following reason(s):        Responsible provider unclear    [facility] action(s):  Route        Medication Therapy Plan: Unable to assess. Patient does not have a PCP or participating provider listed in EPIC. Will defer to last known prescriber for review.      Appointments  past 12m or future 3m with PCP    Date Provider   Last Visit   11/16/2023 Jaron Hull NP   Next Visit   Visit date not found Jaron Hull NP   ED visits in past 90 days: 0        Note composed:8:26 AM 05/13/2024

## 2024-05-14 ENCOUNTER — OFFICE VISIT (OUTPATIENT)
Dept: FAMILY MEDICINE | Facility: CLINIC | Age: 74
End: 2024-05-14
Payer: MEDICARE

## 2024-05-14 VITALS
BODY MASS INDEX: 37.3 KG/M2 | WEIGHT: 237.63 LBS | HEIGHT: 67 IN | SYSTOLIC BLOOD PRESSURE: 122 MMHG | HEART RATE: 91 BPM | DIASTOLIC BLOOD PRESSURE: 78 MMHG | OXYGEN SATURATION: 96 %

## 2024-05-14 DIAGNOSIS — E11.59 HYPERTENSION ASSOCIATED WITH DIABETES: ICD-10-CM

## 2024-05-14 DIAGNOSIS — Z91.09 ENVIRONMENTAL ALLERGIES: ICD-10-CM

## 2024-05-14 DIAGNOSIS — Z76.89 ENCOUNTER TO ESTABLISH CARE: ICD-10-CM

## 2024-05-14 DIAGNOSIS — N40.0 BENIGN PROSTATIC HYPERPLASIA, UNSPECIFIED WHETHER LOWER URINARY TRACT SYMPTOMS PRESENT: ICD-10-CM

## 2024-05-14 DIAGNOSIS — E66.01 SEVERE OBESITY (BMI 35.0-39.9) WITH COMORBIDITY: ICD-10-CM

## 2024-05-14 DIAGNOSIS — Z00.00 ENCOUNTER FOR ANNUAL GENERAL MEDICAL EXAMINATION WITHOUT ABNORMAL FINDINGS IN ADULT: Primary | ICD-10-CM

## 2024-05-14 DIAGNOSIS — E78.5 HYPERLIPIDEMIA, UNSPECIFIED HYPERLIPIDEMIA TYPE: ICD-10-CM

## 2024-05-14 DIAGNOSIS — I15.2 HYPERTENSION ASSOCIATED WITH DIABETES: ICD-10-CM

## 2024-05-14 PROCEDURE — 99214 OFFICE O/P EST MOD 30 MIN: CPT | Mod: PBBFAC,PO | Performed by: STUDENT IN AN ORGANIZED HEALTH CARE EDUCATION/TRAINING PROGRAM

## 2024-05-14 PROCEDURE — 99999 PR PBB SHADOW E&M-EST. PATIENT-LVL IV: CPT | Mod: PBBFAC,,, | Performed by: STUDENT IN AN ORGANIZED HEALTH CARE EDUCATION/TRAINING PROGRAM

## 2024-05-14 PROCEDURE — 99214 OFFICE O/P EST MOD 30 MIN: CPT | Mod: S$PBB,,, | Performed by: STUDENT IN AN ORGANIZED HEALTH CARE EDUCATION/TRAINING PROGRAM

## 2024-05-14 RX ORDER — FLUTICASONE PROPIONATE 50 MCG
2 SPRAY, SUSPENSION (ML) NASAL DAILY
Qty: 16 G | Refills: 4 | Status: SHIPPED | OUTPATIENT
Start: 2024-05-14

## 2024-05-14 RX ORDER — AZELASTINE 1 MG/ML
1 SPRAY, METERED NASAL 2 TIMES DAILY
Qty: 30 ML | Refills: 2 | Status: SHIPPED | OUTPATIENT
Start: 2024-05-14 | End: 2025-05-14

## 2024-05-14 RX ORDER — ATORVASTATIN CALCIUM 10 MG/1
10 TABLET, FILM COATED ORAL NIGHTLY
Qty: 90 TABLET | Refills: 3 | Status: SHIPPED | OUTPATIENT
Start: 2024-05-14

## 2024-05-14 RX ORDER — ATORVASTATIN CALCIUM 10 MG/1
10 TABLET, FILM COATED ORAL NIGHTLY
Qty: 90 TABLET | Refills: 1 | Status: SHIPPED | OUTPATIENT
Start: 2024-05-14 | End: 2024-05-14

## 2024-05-14 NOTE — PROGRESS NOTES
SUBJECTIVE:    CHIEF COMPLAINT:   Chief Complaint   Patient presents with    Establish Care     Leg pain           274}    HISTORY OF PRESENT ILLNESS:  Cali Ho is a 73 y.o. male with ED, HLD, type 2 diabetes mellitus and MO who presents to the clinic to establish [facility].  He reports that he has occasional bilateral hip pain however pain has not been improved with diclofenac medication.  He has tried CBD gummies with some improvement of his hip pain.    He declines trying other medications for pain including muscle relaxants.    PAST MEDICAL HISTORY:     274}  Past Medical History:   Diagnosis Date    Erectile dysfunction     Hyperlipidemia     Type 2 diabetes mellitus        PAST SURGICAL HISTORY:  Past Surgical History:   Procedure Laterality Date    HEMORRHOID SURGERY         SOCIAL HISTORY:  Social History     Socioeconomic History    Marital status:    Tobacco Use    Smoking status: Never    Smokeless tobacco: Never   Substance and Sexual Activity    Alcohol use: Yes     Alcohol/week: 2.0 standard drinks of alcohol     Types: 2 Cans of beer per week     Social Determinants of Health     Financial Resource Strain: Medium Risk (5/13/2024)    Overall Financial Resource Strain (CARDIA)     Difficulty of Paying Living Expenses: Somewhat hard   Food Insecurity: No Food Insecurity (5/13/2024)    Hunger Vital Sign     Worried About Running Out of Food in the Last Year: Never true     Ran Out of Food in the Last Year: Never true   Transportation Needs: No Transportation Needs (5/13/2024)    PRAPARE - Transportation     Lack of Transportation (Medical): No     Lack of Transportation (Non-Medical): No   Physical Activity: Sufficiently Active (5/13/2024)    Exercise Vital Sign     Days of Exercise per Week: 6 days     Minutes of Exercise per Session: 30 min   Stress: Stress Concern Present (5/13/2024)    [employer] - Occupational Stress Questionnaire     Feeling of Stress : To some  extent   Housing Stability: Unknown (5/13/2024)    Housing Stability Vital Sign     Unable to Pay for Housing in the Last Year: No       FAMILY HISTORY:       Family History   Problem Relation Name Age of Onset    Glaucoma Mother      Glaucoma Father      Melanoma Father         ALLERGIES AND MEDICATIONS: updated and reviewed.      274}  Review of patient's allergies indicates:   Allergen Reactions    Dilaudid [hydromorphone]      Medication List with Changes/Refills   New Medications    AZELASTINE (ASTELIN) 137 MCG (0.1 %) NASAL SPRAY    1 spray (137 mcg total) by Nasal route 2 (two) times daily.    FLUTICASONE PROPIONATE (FLONASE) 50 MCG/ACTUATION NASAL SPRAY    2 sprays (100 mcg total) by Each Nostril route Daily. For Allergies   Current Medications    BLOOD SUGAR DIAGNOSTIC (TRUE METRIX GLUCOSE TEST STRIP) STRP    TO CHECK BG 1 TIMES DAILY, TO USE WITH INSURANCE PREFERRED METER    BLOOD SUGAR DIAGNOSTIC STRP    To check BG 2 times daily, to use with insurance preferred meter    BLOOD-GLUCOSE METER (TRUE METRIX AIR GLUCOSE METER) MISC    TO CHECK BG 1 TIMES DAILY, TO USE WITH INSURANCE PREFERRED METER    BLOOD-GLUCOSE METER KIT    To check BG 2 times daily, to use with insurance preferred meter    BLOOD-GLUCOSE METER KIT    Use as instructed    CASCARA SAGRADA ORAL    Take 450 mg by mouth.    DICLOFENAC (VOLTAREN) 50 MG EC TABLET    TAKE 1 TABLET BY MOUTH 2 TIMES DAILY.    ESOMEPRAZOLE (NEXIUM) 40 MG CAPSULE    Take 1 capsule (40 mg total) by mouth before breakfast.    FEXOFENADINE (ALLEGRA) 180 MG TABLET    Take 180 mg by mouth.    FLUCONAZOLE (DIFLUCAN) 150 MG TAB    Take 1 tablet (150 mg total) by mouth once a week.    LANCETS 33 GAUGE MISC    TO CHECK BG 1 TIMES DAILY, TO USE WITH INSURANCE PREFERRED METER    LANCETS MISC    To check BG 2 times daily, to use with insurance preferred meter    LYSINE (L-LYSINE) 500 MG TAB    Take 500 mg by mouth.    MELATONIN 1 MG TAB    Take 1 mg by mouth.    METFORMIN  "(GLUCOPHAGE) 500 MG TABLET    Take 2 tablets (1,000 mg total) by mouth 2 (two) times daily with meals.    MULTIVITAMIN CAPSULE    Take 1 capsule by mouth once daily.    PSYLLIUM HUSK 0.4 GRAM CAP    Take by mouth.    SAW PALMETTO FRUIT 450 MG CAP    Take 3 tablets by mouth every evening.    TRAMADOL (ULTRAM) 50 MG TABLET    Take 50 mg by mouth every 6 (six) hours as needed for Pain.   Changed and/or Refilled Medications    Modified Medication Previous Medication    ATORVASTATIN (LIPITOR) 10 MG TABLET atorvastatin (LIPITOR) 10 MG tablet       Take 1 tablet (10 mg total) by mouth every evening. For Cholesterol    Take 1 tablet (10 mg total) by mouth every evening.   Discontinued Medications    ATORVASTATIN (LIPITOR) 10 MG TABLET    TAKE 1 TABLET BY MOUTH EVERY EVENING.    EFINACONAZOLE (JUBLIA) 10 % CINDY    Apply topically nightly to effected toe nail for 12 weeks.       SCREENING HISTORY:    274}  Health Maintenance         Date Due Completion Date    Diabetes Urine Screening Never done ---    Shingles Vaccine (1 of 2) Never done ---    RSV Vaccine (Age 60+ and Pregnant patients) (1 - 1-dose 60+ series) Never done ---    COVID-19 Vaccine (3 - 2023-24 season) 09/01/2023 3/11/2021    Eye Exam 03/20/2024 3/20/2023    Foot Exam 05/23/2024 5/23/2023    PROSTATE-SPECIFIC ANTIGEN 05/18/2024 5/18/2023    Hemoglobin A1c 11/13/2024 5/13/2024    Lipid Panel 05/13/2025 5/13/2024    Low Dose Statin 05/14/2025 5/14/2024    Colorectal Cancer Screening 10/01/2028 10/1/2018    TETANUS VACCINE 07/12/2031 7/12/2021            REVIEW OF SYSTEMS:   Review of Systems    PHYSICAL EXAM:      274}  /78 (BP Location: Right arm, Patient Position: Sitting, BP Method: Large (Manual))   Pulse 91   Ht 5' 7" (1.702 m)   Wt 107.8 kg (237 lb 10.5 oz)   SpO2 96%   BMI 37.22 kg/m²   Wt Readings from Last 3 Encounters:   05/14/24 107.8 kg (237 lb 10.5 oz)   11/16/23 111.9 kg (246 lb 11.1 oz)   09/14/23 111.6 kg (246 lb 0.5 oz)     BP Readings " "from Last 3 Encounters:   05/14/24 122/78   11/16/23 136/84   09/14/23 136/84     Estimated body mass index is 37.22 kg/m² as calculated from the following:    Height as of this encounter: 5' 7" (1.702 m).    Weight as of this encounter: 107.8 kg (237 lb 10.5 oz).     Physical Exam  Vitals reviewed.   Constitutional:       General: He is not in acute distress.     Appearance: Normal appearance. He is well-developed and normal weight. He is not ill-appearing.   HENT:      Head: Normocephalic and atraumatic.      Right Ear: External ear normal.      Left Ear: External ear normal.      Nose: Nose normal.   Eyes:      Extraocular Movements: Extraocular movements intact.      Conjunctiva/sclera: Conjunctivae normal.      Pupils: Pupils are equal, round, and reactive to light.   Neck:      Thyroid: No thyroid mass.   Cardiovascular:      Rate and Rhythm: Normal rate and regular rhythm.      Pulses: Normal pulses.      Heart sounds: Normal heart sounds, S1 normal and S2 normal. No murmur heard.     No gallop.   Pulmonary:      Effort: Pulmonary effort is normal. No respiratory distress.      Breath sounds: Normal breath sounds and air entry. No stridor. No wheezing, rhonchi or rales.   Abdominal:      General: There is no distension.      Palpations: Abdomen is soft. There is no mass.      Tenderness: There is no abdominal tenderness.   Musculoskeletal:         General: No swelling or deformity. Normal range of motion.      Cervical back: Normal range of motion and neck supple. No edema or erythema.   Skin:     General: Skin is warm and dry.      Findings: No lesion or rash.   Neurological:      Mental Status: He is alert and oriented to person, place, and time. Mental status is at baseline.   Psychiatric:         Mood and Affect: Mood normal.         Behavior: Behavior normal. Behavior is cooperative.         Judgment: Judgment normal.         LABS:   274}  I have reviewed old labs below:  Lab Results   Component Value " Date    WBC 6.09 06/15/2022    HGB 14.8 06/15/2022    HCT 43.1 06/15/2022    MCV 88 06/15/2022     06/15/2022     05/13/2024    K 4.2 05/13/2024     05/13/2024    CALCIUM 9.4 05/13/2024    CO2 20 (L) 05/13/2024     (H) 05/13/2024    BUN 22 05/13/2024    CREATININE 0.9 05/13/2024    ANIONGAP 9 05/13/2024    ESTGFRAFRICA >60 06/15/2022    EGFRNONAA >60 06/15/2022    PROT 6.5 05/13/2024    ALBUMIN 3.8 05/13/2024    BILITOT 0.5 05/13/2024    ALKPHOS 68 05/13/2024    ALT 21 05/13/2024    AST 21 05/13/2024    CHOL 134 05/13/2024    TRIG 127 05/13/2024    HDL 36 (L) 05/13/2024    LDLCALC 72.6 05/13/2024    TSH 0.786 05/13/2024    PSA 3.3 12/06/2022    HGBA1C 6.8 (H) 05/13/2024       ASSESSMENT AND PLAN:  274}  1. Encounter for annual general medical examination without abnormal findings in adult  -     PSA, TOTAL AND FREE; Future; Expected date: 05/14/2024  -     Lipid Panel; Future; Expected date: 11/14/2024  -     Hemoglobin A1C; Future; Expected date: 11/14/2024  -     Comprehensive Metabolic Panel; Future; Expected date: 11/14/2024  -     CBC Without Differential; Future; Expected date: 11/14/2024    2. Encounter to establish care    3. Hypertension associated with diabetes  -     Microalbumin/Creatinine Ratio, Urine; Future; Expected date: 05/14/2024    4. Hyperlipidemia, unspecified hyperlipidemia type  Overview:  Current statin therapy    Orders:  -     atorvastatin (LIPITOR) 10 MG tablet; Take 1 tablet (10 mg total) by mouth every evening. For Cholesterol  Dispense: 90 tablet; Refill: 3  -     Lipid Panel; Future; Expected date: 11/14/2024  -     Hemoglobin A1C; Future; Expected date: 11/14/2024    5. Benign prostatic hyperplasia, unspecified whether lower urinary tract symptoms present  Comments:  Patient uses Saw Palmetto  supplement. Denies any symptoms at this time. Will check PSA  Orders:  -     PSA, TOTAL AND FREE; Future; Expected date: 05/14/2024    6. Environmental allergies  -      azelastine (ASTELIN) 137 mcg (0.1 %) nasal spray; 1 spray (137 mcg total) by Nasal route 2 (two) times daily.  Dispense: 30 mL; Refill: 2  -     fluticasone propionate (FLONASE) 50 mcg/actuation nasal spray; 2 sprays (100 mcg total) by Each Nostril route Daily. For Allergies  Dispense: 16 g; Refill: 4    7. Severe obesity (BMI 35.0-39.9) with comorbidity  Comments:  Recommend low-fat low carb diet, exercise 3-4 times per week for 30-40 minutes as tolerated           Orders Placed This Encounter   Procedures    Microalbumin/Creatinine Ratio, Urine    PSA, TOTAL AND FREE    Lipid Panel    Hemoglobin A1C    Comprehensive Metabolic Panel    CBC Without Differential       Follow up in about 6 months (around 11/14/2024) for f/u labs. or sooner as needed.

## 2024-05-15 ENCOUNTER — TELEPHONE (OUTPATIENT)
Dept: FAMILY MEDICINE | Facility: CLINIC | Age: 74
End: 2024-05-15
Payer: MEDICARE

## 2024-05-15 NOTE — TELEPHONE ENCOUNTER
----- Message from Milena Whitley sent at 5/15/2024 10:47 AM CDT -----  Type:  Patient Returning Call    Who Called:  patient  Who Left Message for Patient:    Does the patient know what this is regarding?:    Best Call Back Number:  [phone] (home)   Additional Information:

## 2024-05-17 ENCOUNTER — PATIENT MESSAGE (OUTPATIENT)
Dept: ADMINISTRATIVE | Facility: OTHER | Age: 74
End: 2024-05-17
Payer: MEDICARE

## 2024-05-20 ENCOUNTER — HOSPITAL ENCOUNTER (OUTPATIENT)
Dept: RADIOLOGY | Facility: HOSPITAL | Age: 74
Discharge: HOME | End: 2024-05-20
Attending: FAMILY MEDICINE
Payer: MEDICARE

## 2024-05-20 ENCOUNTER — OFFICE VISIT (OUTPATIENT)
Dept: ORTHOPEDICS | Facility: CLINIC | Age: 74
End: 2024-05-20
Payer: MEDICARE

## 2024-05-20 VITALS — WEIGHT: 238.06 LBS | HEIGHT: 67 IN | BODY MASS INDEX: 37.36 KG/M2 | RESPIRATION RATE: 16 BRPM

## 2024-05-20 DIAGNOSIS — M79.604 LUMBAR PAIN WITH RADIATION DOWN BOTH LEGS: Primary | ICD-10-CM

## 2024-05-20 DIAGNOSIS — M79.605 LUMBAR PAIN WITH RADIATION DOWN BOTH LEGS: Primary | ICD-10-CM

## 2024-05-20 DIAGNOSIS — M25.552 LEFT HIP PAIN: Primary | ICD-10-CM

## 2024-05-20 DIAGNOSIS — M54.41 CHRONIC BILATERAL LOW BACK PAIN WITH BILATERAL SCIATICA: ICD-10-CM

## 2024-05-20 DIAGNOSIS — G89.29 CHRONIC BILATERAL LOW BACK PAIN WITH BILATERAL SCIATICA: ICD-10-CM

## 2024-05-20 DIAGNOSIS — M54.50 LUMBAR PAIN WITH RADIATION DOWN BOTH LEGS: Primary | ICD-10-CM

## 2024-05-20 DIAGNOSIS — M54.42 CHRONIC BILATERAL LOW BACK PAIN WITH BILATERAL SCIATICA: ICD-10-CM

## 2024-05-20 DIAGNOSIS — M25.552 LEFT HIP PAIN: ICD-10-CM

## 2024-05-20 PROCEDURE — 73502 X-RAY EXAM HIP UNI 2-3 VIEWS: CPT | Mod: TC,PO,LT

## 2024-05-20 PROCEDURE — 99999 PR PBB SHADOW E&M-EST. PATIENT-LVL IV: CPT | Mod: PBBFAC,,, | Performed by: FAMILY MEDICINE

## 2024-05-20 PROCEDURE — 99214 OFFICE O/P EST MOD 30 MIN: CPT | Mod: PBBFAC,25,PO | Performed by: FAMILY MEDICINE

## 2024-05-20 PROCEDURE — 73502 X-RAY EXAM HIP UNI 2-3 VIEWS: CPT | Mod: 26,LT,, | Performed by: RADIOLOGY

## 2024-05-20 PROCEDURE — 99204 OFFICE O/P NEW MOD 45 MIN: CPT | Mod: S$PBB,,, | Performed by: FAMILY MEDICINE

## 2024-05-20 RX ORDER — METHOCARBAMOL 500 MG/1
1000 TABLET, FILM COATED ORAL 3 TIMES DAILY PRN
Qty: 30 TABLET | Refills: 0 | Status: SHIPPED | OUTPATIENT
Start: 2024-05-20

## 2024-05-20 NOTE — PROGRESS NOTES
Subjective     Patient ID: Cali Ho is a 73 y.o. male.    Chief Complaint: Pain of the Left Hip    73-year-old male here today with complaints of left-sided hip pain.  This is a chronic issue that has been bothering him for years.  He also complains of radiation of the pain down both lower extremities into his mid lower limb.  Has a history of low back pain.  Was in the past referred to the back and spine clinic but never was seen there.  He localizes the pain to his left posterior lumbar region.  It is usually not tender to the touch.  Seems to be worse after long periods of walking or activity.  He is tried oral Voltaren but has not provided much help.        Review of Systems   Constitutional: Negative for chills and decreased appetite.   HENT:  Negative for congestion and sore throat.    Eyes:  Negative for blurred vision.   Cardiovascular:  Negative for chest pain, dyspnea on exertion and palpitations.   Respiratory:  Negative for cough and shortness of breath.    Skin:  Negative for rash.   Neurological:  Negative for difficulty with concentration, disturbances in coordination and headaches.   Psychiatric/Behavioral:  Negative for altered mental status, depression, hallucinations, memory loss and suicidal ideas.           Objective     General    Nursing note and vitals reviewed.  Constitutional: He is oriented to person, place, and time. He appears well-developed and well-nourished.   HENT:   Nose: Nose normal.   Eyes: EOM are normal. Pupils are equal, round, and reactive to light.   Neck: Neck supple.   Cardiovascular:  Normal rate.            Pulmonary/Chest: Effort normal.   Abdominal: Soft.   Neurological: He is alert and oriented to person, place, and time. He has normal reflexes.   Psychiatric: He has a normal mood and affect. His behavior is normal. Judgment and thought content normal.             Right Hip Exam   Right hip exam is normal.   Left Hip Exam     Range of Motion   The patient has  normal left hip ROM.    Tests   Pain w/ forced internal rotation (JASMINA): absent  Pain w/ forced external rotation (FADIR): absent  Stincfield test: negative  Log Roll: negative      Back (L-Spine & T-Spine) / Neck (C-Spine) Exam     Tenderness Posterior midline palpation reveals tenderness of the Lower L-Spine. Left paramedian tenderness of the Lower L-Spine.     Back (L-Spine & T-Spine) Range of Motion   Extension:  50   Flexion:  80   Lateral bend right:  normal   Lateral bend left:  normal   Rotation right:  normal   Rotation left:  normal     Spinal Sensation   Right Side Sensation  L-Spine Level: normal  Left Side Sensation  L-Spine Level: normal    Back (L-Spine & T-Spine) Tests   Right Side Tests  Straight leg raise:        Sitting SLR: 70 degrees    Left Side Tests  Straight leg raise:       Sitting SLR: 40 degrees      Other   He has no scoliosis .  Spinal Kyphosis:  Absent  Spinal Lordosis:  Absent      Muscle Strength   Right Lower Extremity   Hip Abduction: 5/5   Hip Flexion: 5/5   Hip Extensors: 5/5  Quadriceps:  5/5   Hamstrin/5   Left Lower Extremity   Hip Abduction: 5/5   Hip Adduction: 5/5   Hip Flexion: 5/5   Hip Extensors: 5/5  Quadriceps:  5/5   Hamstrin/5     Reflexes     Left Side  Quadriceps:  2+    Right Side   Quadriceps:  2+      Physical Exam  Vitals and nursing note reviewed.   Constitutional:       Appearance: He is well-developed and well-nourished.   HENT:      Nose: Nose normal.   Eyes:      Extraocular Movements: EOM normal.      Pupils: Pupils are equal, round, and reactive to light.   Cardiovascular:      Rate and Rhythm: Normal rate.   Pulmonary:      Effort: Pulmonary effort is normal.   Abdominal:      Palpations: Abdomen is soft.   Musculoskeletal:      Cervical back: Normal range of motion and neck supple.   Neurological:      Mental Status: He is alert and oriented to person, place, and time.      Deep Tendon Reflexes: Reflexes are normal and symmetric.    Psychiatric:         Mood and Affect: Mood and affect normal.         Behavior: Behavior normal.         Thought Content: Thought content normal.         Judgment: Judgment normal.     X-ray images ordered obtained interpreted by me.  They show Some very mild degenerative changes in the right hip but otherwise no obvious issues in the left hip or the surrounding areas.  No acute fractures present no evidence of soft tissue injury.  Degenerative changes of the lumbar spine are noted on x-ray as well.          Assessment and Plan     Encounter Diagnoses   Name Primary?    Lumbar pain with radiation down both legs Yes    Chronic bilateral low back pain with bilateral sciatica          Cali was seen today for pain.    Diagnoses and all orders for this visit:    Lumbar pain with radiation down both legs  -     Ambulatory referral/consult to Back & Spine Clinic; Future    Chronic bilateral low back pain with bilateral sciatica    Other orders  -     methocarbamoL (ROBAXIN) 500 MG Tab; Take 2 tablets (1,000 mg total) by mouth 3 (three) times daily as needed (back pain).      No clear disorder with his hip on exam today.  His issue seems to be that of lumbar region pain with bilateral sciatica.  I would like him to see the back and spine clinic for further evaluation and management.  I will prescribe him Robaxin to take as needed in the meantime.

## 2024-05-28 ENCOUNTER — OFFICE VISIT (OUTPATIENT)
Dept: PODIATRY | Facility: CLINIC | Age: 74
End: 2024-05-28
Payer: MEDICARE

## 2024-05-28 VITALS
WEIGHT: 240 LBS | HEART RATE: 90 BPM | DIASTOLIC BLOOD PRESSURE: 93 MMHG | BODY MASS INDEX: 37.67 KG/M2 | HEIGHT: 67 IN | SYSTOLIC BLOOD PRESSURE: 136 MMHG

## 2024-05-28 DIAGNOSIS — E11.69 TYPE 2 DIABETES MELLITUS WITH OTHER SPECIFIED COMPLICATION, WITHOUT LONG-TERM CURRENT USE OF INSULIN: Primary | ICD-10-CM

## 2024-05-28 DIAGNOSIS — L60.0 INGROWN NAIL: ICD-10-CM

## 2024-05-28 DIAGNOSIS — E11.9 COMPREHENSIVE DIABETIC FOOT EXAMINATION, TYPE 2 DM, ENCOUNTER FOR: ICD-10-CM

## 2024-05-28 PROCEDURE — 99999 PR PBB SHADOW E&M-EST. PATIENT-LVL V: CPT | Mod: PBBFAC,,, | Performed by: PODIATRIST

## 2024-05-28 PROCEDURE — 99215 OFFICE O/P EST HI 40 MIN: CPT | Mod: PBBFAC,PN | Performed by: PODIATRIST

## 2024-05-28 PROCEDURE — 99203 OFFICE O/P NEW LOW 30 MIN: CPT | Mod: S$PBB,,, | Performed by: PODIATRIST

## 2024-05-29 ENCOUNTER — HOSPITAL ENCOUNTER (OUTPATIENT)
Dept: RADIOLOGY | Facility: HOSPITAL | Age: 74
Discharge: HOME | End: 2024-05-29
Attending: PHYSICAL MEDICINE & REHABILITATION
Payer: MEDICARE

## 2024-05-29 ENCOUNTER — OFFICE VISIT (OUTPATIENT)
Dept: SPINE | Facility: CLINIC | Age: 74
End: 2024-05-29
Payer: MEDICARE

## 2024-05-29 VITALS — BODY MASS INDEX: 37.68 KG/M2 | WEIGHT: 240.06 LBS | HEIGHT: 67 IN

## 2024-05-29 DIAGNOSIS — M79.604 LUMBAR PAIN WITH RADIATION DOWN BOTH LEGS: ICD-10-CM

## 2024-05-29 DIAGNOSIS — M54.41 CHRONIC BILATERAL LOW BACK PAIN WITH BILATERAL SCIATICA: Primary | ICD-10-CM

## 2024-05-29 DIAGNOSIS — M54.50 LUMBAR PAIN WITH RADIATION DOWN BOTH LEGS: ICD-10-CM

## 2024-05-29 DIAGNOSIS — M54.42 CHRONIC BILATERAL LOW BACK PAIN WITH BILATERAL SCIATICA: Primary | ICD-10-CM

## 2024-05-29 DIAGNOSIS — M79.605 LUMBAR PAIN WITH RADIATION DOWN BOTH LEGS: ICD-10-CM

## 2024-05-29 DIAGNOSIS — M54.42 CHRONIC BILATERAL LOW BACK PAIN WITH BILATERAL SCIATICA: ICD-10-CM

## 2024-05-29 DIAGNOSIS — M54.41 CHRONIC BILATERAL LOW BACK PAIN WITH BILATERAL SCIATICA: ICD-10-CM

## 2024-05-29 DIAGNOSIS — G89.29 CHRONIC BILATERAL LOW BACK PAIN WITH BILATERAL SCIATICA: ICD-10-CM

## 2024-05-29 DIAGNOSIS — G89.29 CHRONIC BILATERAL LOW BACK PAIN WITH BILATERAL SCIATICA: Primary | ICD-10-CM

## 2024-05-29 PROCEDURE — 99204 OFFICE O/P NEW MOD 45 MIN: CPT | Mod: S$PBB,,, | Performed by: PHYSICAL MEDICINE & REHABILITATION

## 2024-05-29 PROCEDURE — 72114 X-RAY EXAM L-S SPINE BENDING: CPT | Mod: 26,,, | Performed by: RADIOLOGY

## 2024-05-29 PROCEDURE — 72114 X-RAY EXAM L-S SPINE BENDING: CPT | Mod: TC

## 2024-05-29 NOTE — PROGRESS NOTES
SUBJECTIVE:    Patient ID: Cali Ho is a 74 y.o. male.    Chief Complaint: Back Pain    This is a 74-year-old man who sees Dr. Villegas for his primary care.  History of diabetes and hyperlipidemia otherwise denies any chronic major medical problems.  No cancer history.  Long history of low back pain primarily on the left side.  Radiates into the posterior portion of both legs into the calves.  Remote history of chiropractic treatment but no recent treatment.  Denies bowel or bladder dysfunction fever chills sweats or unexpected weight loss.  Current pain level is 3/10 but at times as high as 10/10 and interferes with his quality of life in terms of activities of daily living recreation and social activities.  I have no imaging to review          Past Medical History:   Diagnosis Date    Erectile dysfunction     Hyperlipidemia     Type 2 diabetes mellitus      Social History     Socioeconomic History    Marital status:    Tobacco Use    Smoking status: Never    Smokeless tobacco: Never   Substance and Sexual Activity    Alcohol use: Yes     Alcohol/week: 2.0 standard drinks of alcohol     Types: 2 Cans of beer per week     Social Determinants of Health     Financial Resource Strain: Medium Risk (5/13/2024)    Overall Financial Resource Strain (CARDIA)     Difficulty of Paying Living Expenses: Somewhat hard   Food Insecurity: No Food Insecurity (5/13/2024)    Hunger Vital Sign     Worried About Running Out of Food in the Last Year: Never true     Ran Out of Food in the Last Year: Never true   Transportation Needs: No Transportation Needs (5/13/2024)    PRAPARE - Transportation     Lack of Transportation (Medical): No     Lack of Transportation (Non-Medical): No   Physical Activity: Sufficiently Active (5/13/2024)    Exercise Vital Sign     Days of Exercise per Week: 6 days     Minutes of Exercise per Session: 30 min   Stress: Stress Concern Present (5/13/2024)    [employer] -  "Occupational Stress Questionnaire     Feeling of Stress : To some extent   Housing Stability: Unknown (5/13/2024)    Housing Stability Vital Sign     Unable to Pay for Housing in the Last Year: No     Past Surgical History:   Procedure Laterality Date    HEMORRHOID SURGERY       Family History   Problem Relation Name Age of Onset    Glaucoma Mother      Glaucoma Father      Melanoma Father       Vitals:    05/29/24 1447   Weight: 108.9 kg (240 lb 1.3 oz)   Height: 5' 7" (1.702 m)       Review of Systems   Constitutional:  Negative for chills, diaphoresis, fatigue, fever and unexpected weight change.   HENT:  Negative for trouble swallowing.    Eyes:  Negative for visual disturbance.   Respiratory:  Negative for shortness of breath.    Cardiovascular:  Negative for chest pain.   Gastrointestinal:  Negative for abdominal pain, constipation, diarrhea, nausea and vomiting.   Genitourinary:  Negative for difficulty urinating.   Musculoskeletal:  Negative for arthralgias, back pain, gait problem, joint swelling, myalgias, neck pain and neck stiffness.   Neurological:  Negative for dizziness, speech difficulty, weakness, light-headedness, numbness and headaches.          Objective:      Physical Exam  Neurological:      Mental Status: He is alert and oriented to person, place, and time.      Comments: He is awake and in no acute distress  Mild tenderness to palpation lumbar paraspinous musculature at the lumbosacral junction with no palpable masses  Forward flexion of the lumbar spine is to about 60° before complaint of pain at the lumbosacral junction.  Extension at 10° causes mild pain at the lumbosacral junction  Reflexes- +1-+2 reflexes at the following:   C5-Biceps   C6-Brachioradialis   C7-Triceps   L3/4-Patellar   S1-Achilles  Lydia sign is negative bilaterally  Strength testing- 5/5 strength in the following muscle groups:  C5-Elbow flexion  C6-Wrist extension  C7-Elbow extension  C8-Finger flexion  T1-Finger " abduction  L2-Hip flexion  L3-Knee extension  L4-Ankle dorsiflexion  L5-Great toe extension  S1-Ankle plantar flexion                       Assessment:       1. Chronic bilateral low back pain with bilateral sciatica    2. Lumbar pain with radiation down both legs           Plan:     He has a nonfocal neurological examination and no historical red flags.  I suspect he has chronic back pain on basis of degenerative disc disease and facet arthropathy.  Has pain with facet loading.  Possible radicular component to his bilateral leg symptoms.  No evidence of nerve root dysfunction.  I think he can be treated conservatively.  I am going to get some x-rays and start him in physical therapy.  Follow up with me at the completion of therapy      Chronic bilateral low back pain with bilateral sciatica  -     X-Ray Lumbar Complete Including Flex And Ext; Future; Expected date: 05/29/2024  -     Ambulatory referral/consult to Physical/Occupational Therapy; Future; Expected date: 06/05/2024    Lumbar pain with radiation down both legs  -     Ambulatory referral/consult to Back [facility] Spine Clinic

## 2024-05-30 ENCOUNTER — PATIENT MESSAGE (OUTPATIENT)
Dept: SPINE | Facility: CLINIC | Age: 74
End: 2024-05-30
Payer: MEDICARE

## 2024-05-30 PROBLEM — E11.9 COMPREHENSIVE DIABETIC FOOT EXAMINATION, TYPE 2 DM, ENCOUNTER FOR: Status: ACTIVE | Noted: 2021-07-12

## 2024-05-30 PROBLEM — L60.0 INGROWN NAIL: Status: ACTIVE | Noted: 2024-05-30

## 2024-05-30 NOTE — PROGRESS NOTES
Subjective:       Patient ID: Cali Ho is a 74 y.o. male.    Chief Complaint: Ingrown Toenail and Toe Pain    Patient presents today for a new patient diabetic evaluation he states he has been a type 2 diabetic x3 years he relates a shooting pain in his right great toe that he believes is infected.  Patient states he has been having problems with the right great toe ever since December.    Past Medical History:   Diagnosis Date    Erectile dysfunction     Hyperlipidemia     Type 2 diabetes mellitus      Past Surgical History:   Procedure Laterality Date    HEMORRHOID SURGERY       Family History   Problem Relation Name Age of Onset    Glaucoma Mother      Glaucoma Father      Melanoma Father       Social History     Socioeconomic History    Marital status:    Tobacco Use    Smoking status: Never    Smokeless tobacco: Never   Substance and Sexual Activity    Alcohol use: Yes     Alcohol/week: 2.0 standard drinks of alcohol     Types: 2 Cans of beer per week     Social Determinants of Health     Financial Resource Strain: Medium Risk (5/13/2024)    Overall Financial Resource Strain (CARDIA)     Difficulty of Paying Living Expenses: Somewhat hard   Food Insecurity: No Food Insecurity (5/13/2024)    Hunger Vital Sign     Worried About Running Out of Food in the Last Year: Never true     Ran Out of Food in the Last Year: Never true   Transportation Needs: No Transportation Needs (5/13/2024)    PRAPARE - Transportation     Lack of Transportation (Medical): No     Lack of Transportation (Non-Medical): No   Physical Activity: Sufficiently Active (5/13/2024)    Exercise Vital Sign     Days of Exercise per Week: 6 days     Minutes of Exercise per Session: 30 min   Stress: Stress Concern Present (5/13/2024)    [employer] - Occupational Stress Questionnaire     Feeling of Stress : To some extent   Housing Stability: Unknown (5/13/2024)    Housing Stability Vital Sign     Unable to Pay  for Housing in the Last Year: No       Current Outpatient Medications   Medication Sig Dispense Refill    atorvastatin (LIPITOR) 10 MG tablet Take 1 tablet (10 mg total) by mouth every evening. For Cholesterol 90 tablet 3    azelastine (ASTELIN) 137 mcg (0.1 %) nasal spray 1 spray (137 mcg total) by Nasal route 2 (two) times daily. 30 mL 2    blood sugar diagnostic (TRUE METRIX GLUCOSE TEST STRIP) Strp TO CHECK BG 1 TIMES DAILY, TO USE WITH INSURANCE PREFERRED METER 100 strip 11    blood sugar diagnostic Strp To check BG 2 times daily, to use with insurance preferred meter 100 strip 11    blood-glucose meter (TRUE METRIX AIR GLUCOSE METER) Misc TO CHECK BG 1 TIMES DAILY, TO USE WITH INSURANCE PREFERRED METER 1 each 0    blood-glucose meter kit To check BG 2 times daily, to use with insurance preferred meter 1 each 0    blood-glucose meter kit Use as instructed 1 each 0    CASCARA SAGRADA ORAL Take 450 mg by mouth.      diclofenac (VOLTAREN) 50 MG EC tablet TAKE 1 TABLET BY MOUTH 2 TIMES DAILY. 60 tablet 2    esomeprazole (NEXIUM) 40 MG capsule Take 1 capsule (40 mg total) by mouth before breakfast. 30 capsule 11    fexofenadine (ALLEGRA) 180 MG tablet Take 180 mg by mouth.      fluconazole (DIFLUCAN) 150 MG Tab Take 1 tablet (150 mg total) by mouth once a week. 12 tablet 1    fluticasone propionate (FLONASE) 50 mcg/actuation nasal spray 2 sprays (100 mcg total) by Each Nostril route Daily. For Allergies 16 g 4    lancets 33 gauge Misc TO CHECK BG 1 TIMES DAILY, TO USE WITH INSURANCE PREFERRED METER 100 each 11    lancets Misc To check BG 2 times daily, to use with insurance preferred meter 100 each 11    lysine (L-LYSINE) 500 mg Tab Take 500 mg by mouth.      melatonin 1 mg Tab Take 1 mg by mouth.      metFORMIN (GLUCOPHAGE) 500 MG tablet Take 2 tablets (1,000 mg total) by mouth 2 (two) times daily with meals. 360 tablet 1    methocarbamoL (ROBAXIN) 500 MG Tab Take 2 tablets (1,000 mg total) by mouth 3 (three) times  "daily as needed (back pain). 30 tablet 0    multivitamin capsule Take 1 capsule by mouth once daily.      psyllium husk 0.4 gram Cap Take by mouth.      saw palmetto fruit 450 mg Cap Take 3 tablets by mouth every evening.      traMADoL (ULTRAM) 50 mg tablet Take 50 mg by mouth every 6 (six) hours as needed for Pain.       No current facility-administered medications for this visit.     Review of patient's allergies indicates:   Allergen Reactions    Dilaudid [hydromorphone]     Gabapentin        Review of Systems   Musculoskeletal:  Positive for arthralgias.   Skin:  Positive for color change.   All other systems reviewed and are negative.      Objective:      Vitals:    05/28/24 1508   BP: (!) 136/93   BP Location: Left arm   Patient Position: Sitting   Pulse: 90   Weight: 108.9 kg (240 lb)   Height: 5' 7" (1.702 m)     Physical Exam  Vitals and nursing note reviewed.   Constitutional:       Appearance: Normal appearance.   Cardiovascular:      Pulses:           Dorsalis pedis pulses are 1+ on the right side and 1+ on the left side.        Posterior tibial pulses are 1+ on the right side and 1+ on the left side.   Pulmonary:      Effort: Pulmonary effort is normal.   Musculoskeletal:         General: Swelling and tenderness present.   Feet:      Right foot:      Protective Sensation: 3 sites tested.  3 sites sensed.      Skin integrity: Erythema and warmth present.      Toenail Condition: Right toenails are ingrown.      Left foot:      Protective Sensation: 3 sites tested.  3 sites sensed.   Skin:     Capillary Refill: Capillary refill takes 2 to 3 seconds.      Findings: Erythema present.   Neurological:      General: No focal deficit present.      Mental Status: He is alert.   Psychiatric:         Mood and Affect: Mood normal.         Behavior: Behavior normal.                    Assessment:       1. Type 2 diabetes mellitus with other specified complication, without long-term current use of insulin    2. " Ingrown nail    3. Comprehensive diabetic foot examination, type 2 DM, encounter for        Plan:       Patient presents today for a new patient diabetic evaluation he states he has been a type 2 diabetic x3 years he relates a shooting pain in his right great toe that he believes is infected.  Patient states he has been having problems with the right great toe ever since December.  A comprehensive new patient diabetic evaluation was performed today diabetic education provided patient does have significantly ingrown toenail with early signs of infection along the distal lateral border of the right hallux.  I was able to aggressively trim and remove a large portion of nail from the distal lateral border of the right hallux patient still had some degree of discomfort I advised the patient it may take several days for this to settle down completely however this needs to be kept trimmed out properly he is going to start soaking this every day in warm water with Epson salt or table salt for 20 minutes I have recommended the application of antibiotic ointment times several days antibiotic ointment and a light bandage was applied today.  Patient advised to do the soaking for the next 10 days I do plan to follow up with him in 2 weeks certainly if for any reason his condition becomes worse he is to contact me immediately otherwise I will see him as scheduled.  Patient advised any cuts scrapes abrasions areas of irritation redness swelling or drainage to contact us immediately with his history of diabetes puts him at higher risk for complication.  Patient did not need an oral antibiotic he did not need a nail avulsion at this time however if this continues to be a problem in the future it may be a consideration as a nail avulsion would likely be a more permanent solution if the patient is not able to keep it properly trimmed.This note was created using [employer] voice recognition software that occasionally misinterpreted  phrases or words.

## 2024-06-11 ENCOUNTER — OFFICE VISIT (OUTPATIENT)
Dept: PODIATRY | Facility: CLINIC | Age: 74
End: 2024-06-11
Payer: MEDICARE

## 2024-06-11 VITALS
SYSTOLIC BLOOD PRESSURE: 136 MMHG | WEIGHT: 240.06 LBS | HEART RATE: 86 BPM | DIASTOLIC BLOOD PRESSURE: 87 MMHG | HEIGHT: 67 IN | BODY MASS INDEX: 37.68 KG/M2

## 2024-06-11 DIAGNOSIS — L60.0 INGROWN NAIL: Primary | ICD-10-CM

## 2024-06-11 DIAGNOSIS — E11.9 COMPREHENSIVE DIABETIC FOOT EXAMINATION, TYPE 2 DM, ENCOUNTER FOR: ICD-10-CM

## 2024-06-11 DIAGNOSIS — E11.69 TYPE 2 DIABETES MELLITUS WITH OTHER SPECIFIED COMPLICATION, WITHOUT LONG-TERM CURRENT USE OF INSULIN: ICD-10-CM

## 2024-06-11 PROCEDURE — 99213 OFFICE O/P EST LOW 20 MIN: CPT | Mod: S$PBB,,, | Performed by: PODIATRIST

## 2024-06-11 PROCEDURE — 99215 OFFICE O/P EST HI 40 MIN: CPT | Mod: PBBFAC,PN | Performed by: PODIATRIST

## 2024-06-11 PROCEDURE — 99999 PR PBB SHADOW E&M-EST. PATIENT-LVL V: CPT | Mod: PBBFAC,,, | Performed by: PODIATRIST

## 2024-06-15 NOTE — PROGRESS NOTES
Subjective:       Patient ID: Cali Ho is a 74 y.o. male.    Chief Complaint: Ingrown Toenail and Follow-up   Patient presents today for follow-up of an ingrown toenail right great toe.  Past Medical History:   Diagnosis Date    Erectile dysfunction     Hyperlipidemia     Type 2 diabetes mellitus      Past Surgical History:   Procedure Laterality Date    HEMORRHOID SURGERY       Family History   Problem Relation Name Age of Onset    Glaucoma Mother      Glaucoma Father      Melanoma Father       Social History     Socioeconomic History    Marital status:    Tobacco Use    Smoking status: Never    Smokeless tobacco: Never   Substance and Sexual Activity    Alcohol use: Yes     Alcohol/week: 2.0 standard drinks of alcohol     Types: 2 Cans of beer per week     Social Determinants of Health     Financial Resource Strain: Medium Risk (5/13/2024)    Overall Financial Resource Strain (CARDIA)     Difficulty of Paying Living Expenses: Somewhat hard   Food Insecurity: No Food Insecurity (5/13/2024)    Hunger Vital Sign     Worried About Running Out of Food in the Last Year: Never true     Ran Out of Food in the Last Year: Never true   Transportation Needs: No Transportation Needs (5/13/2024)    PRAPARE - Transportation     Lack of Transportation (Medical): No     Lack of Transportation (Non-Medical): No   Physical Activity: Sufficiently Active (5/13/2024)    Exercise Vital Sign     Days of Exercise per Week: 6 days     Minutes of Exercise per Session: 30 min   Stress: Stress Concern Present (5/13/2024)    [employer] - Occupational Stress Questionnaire     Feeling of Stress : To some extent   Housing Stability: Unknown (5/13/2024)    Housing Stability Vital Sign     Unable to Pay for Housing in the Last Year: No       Current Outpatient Medications   Medication Sig Dispense Refill    atorvastatin (LIPITOR) 10 MG tablet Take 1 tablet (10 mg total) by mouth every evening. For  Cholesterol 90 tablet 3    azelastine (ASTELIN) 137 mcg (0.1 %) nasal spray 1 spray (137 mcg total) by Nasal route 2 (two) times daily. 30 mL 2    blood sugar diagnostic (TRUE METRIX GLUCOSE TEST STRIP) Strp TO CHECK BG 1 TIMES DAILY, TO USE WITH INSURANCE PREFERRED METER 100 strip 11    blood sugar diagnostic Strp To check BG 2 times daily, to use with insurance preferred meter 100 strip 11    blood-glucose meter (TRUE METRIX AIR GLUCOSE METER) Misc TO CHECK BG 1 TIMES DAILY, TO USE WITH INSURANCE PREFERRED METER 1 each 0    blood-glucose meter kit To check BG 2 times daily, to use with insurance preferred meter 1 each 0    blood-glucose meter kit Use as instructed 1 each 0    CASCARA SAGRADA ORAL Take 450 mg by mouth.      diclofenac (VOLTAREN) 50 MG EC tablet TAKE 1 TABLET BY MOUTH 2 TIMES DAILY. 60 tablet 2    esomeprazole (NEXIUM) 40 MG capsule Take 1 capsule (40 mg total) by mouth before breakfast. 30 capsule 11    fexofenadine (ALLEGRA) 180 MG tablet Take 180 mg by mouth.      fluconazole (DIFLUCAN) 150 MG Tab Take 1 tablet (150 mg total) by mouth once a week. 12 tablet 1    fluticasone propionate (FLONASE) 50 mcg/actuation nasal spray 2 sprays (100 mcg total) by Each Nostril route Daily. For Allergies 16 g 4    lancets 33 gauge Misc TO CHECK BG 1 TIMES DAILY, TO USE WITH INSURANCE PREFERRED METER 100 each 11    lancets Misc To check BG 2 times daily, to use with insurance preferred meter 100 each 11    lysine (L-LYSINE) 500 mg Tab Take 500 mg by mouth.      melatonin 1 mg Tab Take 1 mg by mouth.      metFORMIN (GLUCOPHAGE) 500 MG tablet Take 2 tablets (1,000 mg total) by mouth 2 (two) times daily with meals. 360 tablet 1    methocarbamoL (ROBAXIN) 500 MG Tab Take 2 tablets (1,000 mg total) by mouth 3 (three) times daily as needed (back pain). 30 tablet 0    multivitamin capsule Take 1 capsule by mouth once daily.      psyllium husk 0.4 gram Cap Take by mouth.      saw palmetto fruit 450 mg Cap Take 3 tablets  "by mouth every evening.      traMADoL (ULTRAM) 50 mg tablet Take 50 mg by mouth every 6 (six) hours as needed for Pain.       No current facility-administered medications for this visit.     Review of patient's allergies indicates:   Allergen Reactions    Dilaudid [hydromorphone]     Gabapentin        Review of Systems   Musculoskeletal:  Positive for arthralgias.   Skin:  Positive for color change.   All other systems reviewed and are negative.      Objective:      Vitals:    06/11/24 1440   BP: 136/87   BP Location: Right arm   Patient Position: Sitting   Pulse: 86   Weight: 108.9 kg (240 lb 1.3 oz)   Height: 5' 7" (1.702 m)     Physical Exam  Vitals and nursing note reviewed.   Constitutional:       Appearance: Normal appearance.   Cardiovascular:      Pulses:           Dorsalis pedis pulses are 1+ on the right side and 1+ on the left side.        Posterior tibial pulses are 1+ on the right side and 1+ on the left side.   Pulmonary:      Effort: Pulmonary effort is normal.   Musculoskeletal:         General: Swelling and tenderness present.   Feet:      Right foot:      Protective Sensation: 3 sites tested.  3 sites sensed.      Skin integrity: Erythema and warmth present.      Toenail Condition: Right toenails are ingrown.      Left foot:      Protective Sensation: 3 sites tested.  3 sites sensed.   Skin:     Capillary Refill: Capillary refill takes 2 to 3 seconds.      Findings: Erythema present.   Neurological:      General: No focal deficit present.      Mental Status: He is alert.   Psychiatric:         Mood and Affect: Mood normal.         Behavior: Behavior normal.                          Assessment:       1. Ingrown nail    2. Type 2 diabetes mellitus with other specified complication, without long-term current use of insulin    3. Comprehensive diabetic foot examination, type 2 DM, encounter for        Plan:       Patient presents today for follow-up of an ingrown toenail right great toe.  Patient " states his toe is doing a lot better he related that it felt better almost immediately however there was some residual nail along the lateral border of the right great toe I felt I needed to remove some additional nail which was removed today I just want to ensure that as the nail grows back it does not grow back ingrown and infected again.  Patient has a small cyst present on the distal 2nd digit right this is something to monitor it has not causing the patient any pain or discomfort in his likely related to underlying degenerative changes.  Comprehensive diabetic evaluation performed follow-up as needed patient advised if the ingrown toenail starts to bother him again to contact us immediately.  This note was created using [employer] voice recognition software that occasionally misinterpreted phrases or words.

## 2024-06-27 RX ORDER — DICLOFENAC SODIUM 50 MG/1
50 TABLET, DELAYED RELEASE ORAL 2 TIMES DAILY
Qty: 60 TABLET | Refills: 2 | Status: SHIPPED | OUTPATIENT
Start: 2024-06-27

## 2024-06-27 RX ORDER — DICLOFENAC SODIUM 50 MG/1
50 TABLET, DELAYED RELEASE ORAL 2 TIMES DAILY
Qty: 60 TABLET | Refills: 2 | OUTPATIENT
Start: 2024-06-27

## 2024-08-30 ENCOUNTER — PATIENT MESSAGE (OUTPATIENT)
Dept: ADMINISTRATIVE | Facility: HOSPITAL | Age: 74
End: 2024-08-30
Payer: MEDICARE

## 2024-08-30 ENCOUNTER — OFFICE VISIT (OUTPATIENT)
Dept: FAMILY MEDICINE | Facility: CLINIC | Age: 74
End: 2024-08-30
Payer: MEDICARE

## 2024-08-30 VITALS
BODY MASS INDEX: 35.82 KG/M2 | OXYGEN SATURATION: 96 % | RESPIRATION RATE: 16 BRPM | TEMPERATURE: 99 F | DIASTOLIC BLOOD PRESSURE: 72 MMHG | HEIGHT: 67 IN | HEART RATE: 82 BPM | WEIGHT: 228.19 LBS | SYSTOLIC BLOOD PRESSURE: 124 MMHG

## 2024-08-30 DIAGNOSIS — I44.0 FIRST DEGREE AV BLOCK: Primary | ICD-10-CM

## 2024-08-30 DIAGNOSIS — E11.69 TYPE 2 DIABETES MELLITUS WITH OTHER SPECIFIED COMPLICATION, WITHOUT LONG-TERM CURRENT USE OF INSULIN: ICD-10-CM

## 2024-08-30 DIAGNOSIS — R42 DIZZINESS: ICD-10-CM

## 2024-08-30 PROCEDURE — 99215 OFFICE O/P EST HI 40 MIN: CPT | Mod: PBBFAC,PO

## 2024-08-30 PROCEDURE — 99999 PR PBB SHADOW E&M-EST. PATIENT-LVL V: CPT | Mod: PBBFAC,,,

## 2024-08-30 NOTE — PROGRESS NOTES
"  Problem: Psychotic Symptoms  Goal: Psychotic Symptoms  Description: Signs and symptoms of listed problems will be absent or manageable.  Outcome: Progressing     Problem: Confusion Chronic  Goal: Optimal Cognitive Function  Outcome: Progressing  Intervention: Minimize Injury Risk and Provide Safety    Problem: Psychotic Signs/Symptoms  Goal: Optimal Cognitive Function (Psychotic Signs/Symptoms)  Outcome: Progressing  Intervention: Support and Promote Cognitive Ability    Patient up and visible on the unit at the beginning of the shift, watching TV. Patient presented with flat and blunted affect. Compliant with assessments questions and plan of care. Patient denies all mental health questions., Denies SI/SIB/hallucinations. Denies pain or discomfort. Continues on SIO (1:1) for assault and intrusiveness. Patient verbally contracts for safety, safety checks and precautions in place. No behavioral or safety concerns noted this shift. Medication compliant. Patient has some intermittent confusion this shift, but able to follow directions. Patient is eating and drinking adequately. Patient is able to make needs known. This writer will continue to monitor and offer therapeutic support as needed for the rest of the shift.    /78 (BP Location: Right arm, Patient Position: Sitting)   Pulse 88   Temp 96.9  F (36.1  C) (Temporal)   Resp 18   Ht 1.753 m (5' 9\")   Wt 84.4 kg (186 lb)   SpO2 97%   BMI 27.47 kg/m      " Subjective:       Patient ID:  [id number]     Chief Complaint: Irregular Heart Beat      Cali Valerio a 74 y.o. male who presents to the clinic for irregular heartbeat concerns.      Patient reports that he was at a health fair recently air he was told that he had an irregular heartbeat.  Patient presents today for further assessment.  EKG in clinic significant for sinus rhythm with sinus arrhythmia with first-degree AV block.  EKG performed in 2017 also showed first-degree AV block.  Patient reports that he does experience mild dizziness when bending over or moving from a sitting to standing position.  However, he does deny chest pain, shortness of breath, syncope, presyncopal episodes, lower extremity swelling.  No other concerns today    Past Medical History:   Diagnosis Date    Erectile dysfunction     Hyperlipidemia     Type 2 diabetes mellitus       Active Problem List with Overview Notes    Diagnosis Date Noted    First degree AV block 08/30/2024    Ingrown nail 05/30/2024    Severe obesity (BMI 35.0-39.9) with comorbidity 12/12/2022    Hyperlipidemia      Current statin therapy      S/P insertion of penile implant 06/15/2022    Chronic bilateral low back pain with bilateral sciatica 02/01/2022    Comprehensive diabetic foot examination, type 2 DM, encounter for 07/12/2021    BPH (benign prostatic hyperplasia) 03/14/2017    Erectile dysfunction due to arterial insufficiency 01/31/2017      Review of patient's allergies indicates:   Allergen Reactions    Dilaudid [hydromorphone]     Gabapentin          Current Outpatient Medications:     atorvastatin (LIPITOR) 10 MG tablet, Take 1 tablet (10 mg total) by mouth every evening. For Cholesterol, Disp: 90 tablet, Rfl: 3    azelastine (ASTELIN) 137 mcg (0.1 %) nasal spray, 1 spray (137 mcg total) by Nasal route 2 (two) times daily., Disp: 30 mL, Rfl: 2    blood sugar diagnostic (TRUE METRIX GLUCOSE TEST STRIP) Strp, TO CHECK BG 1 TIMES DAILY, TO USE WITH INSURANCE  [facility]    Cardiology Progress Note     Assessment & Plan   1.  Decompensated heart failure with preserved ejection fraction, possibly precipitated by Sildenafil pulmonary vasodilator therapy.  2.  Scleroderma and rheumatoid arthritis, on disease-modifying immunosuppressive agents.  3.  Moderate pulmonary arterial hypertension in the setting of scleroderma (mean PA 34 mmHg).   4.  Right and left-sided elevated filling pressures on recent right heart catheterization (04/2019).   5.  Permanent atrial fibrillation -- rate controlled on apixaban anticoagulation strategy.   6.  Hypertension.   7.  Mild anemia.   8.  Elderly frailty.      ASSESSMENT/PLAN:  Responding well to IV bumetanide infusion.  In sinus rhythm.  I suspect she is still has 10 to 15 pounds of extra volume for diuresis.  She will require another 24 to 48 hours of IV bumetanide, followed by oral diuretic therapy in the hospital for a day.  She is frail, will likely go to transitional care unit.     1.  Bumetanide 0.25 mg/h.  (-10L during stay) Goal of -2L today, creatinine stable.  Will give 1mg Bumex bolus   2.  Monitor renal function.  3.  Monitor electrolytes  4.  Continue nifedipine and sildenafil 20 mg 3 times daily that was started by Dr. Marquez for her pulmonary arterial hypertension.  5.  Inpatient physical therapy to address deconditioning.  6.  Cardiology will follow.       Everardo Hogue MD    Interval History   Has made progress with diuresis but continues to be short of breath and volume overloaded    Physical Exam   Temp: 98.4  F (36.9  C) Temp src: Oral BP: 109/59 Pulse: 86 Heart Rate: 90 Resp: 18 SpO2: 95 % O2 Device: Nasal cannula Oxygen Delivery: 3 LPM  Vitals:    11/27/19 0638 11/29/19 1025 11/30/19 0251   Weight: 98.2 kg (216 lb 8 oz) 95.9 kg (211 lb 6.7 oz) 95.2 kg (209 lb 14.1 oz)     Vital Signs with Ranges  Temp:  [97.5  F (36.4  C)-98.4  F (36.9  C)] 98.4  F (36.9  C)  Pulse:  [83-86] 86  Heart Rate:   [90-91] 90  Resp:  [16-19] 18  BP: (104-109)/(59-71) 109/59  SpO2:  [92 %-95 %] 95 %  I/O last 3 completed shifts:  In: 1320 [P.O.:1320]  Out: 3050 [Urine:3050]    GENERAL APPEARANCE: Alert and oriented x3. No acute distress.  SKIN: Inspection of the skin reveals no rashes, ulcerations, warm, dry  NECK: Supple and symmetric.   Difficult to assess but appears elevated JVP  LUNGS: Rales in the bases  CARDIOVASCULAR: S1, S2, regular rate and rhythm without any murmurs, gallops, rubs. The carotid pulses were normal and 2+ bilaterally without bruits. Peripheral pulses were 2+ and symmetric.  ABDOMEN: Soft, non-tender, non-distended with normal bowel sounds.  No ascites noted.  EXTREMITIES: 1-2+ edema to the thighs edema.  NEUROLOGIC:  Normal mood and affect.  Sensation to touch was normal.    Medications     bumetanide 0.25 mg/hr (11/30/19 1016)     - MEDICATION INSTRUCTIONS -       - MEDICATION INSTRUCTIONS -         apixaban ANTICOAGULANT  5 mg Oral BID     cholecalciferol  1,600 Units Oral Daily     folic acid  1,000 mcg Oral Daily     levothyroxine  75 mcg Oral Daily     methotrexate  25 mg Oral Weekly     metoprolol succinate ER  25 mg Oral Daily     multivitamin w/minerals  1 tablet Oral Daily     NIFEdipine ER  60 mg Oral Daily     omeprazole  20 mg Oral BID AC     potassium chloride  40 mEq Oral Daily     Prednisolon-Gatiflox-Bromfenac  1 drop Right Eye Daily     psyllium  1 capsule Oral Daily     sildenafil  20 mg Oral TID     simvastatin  40 mg Oral At Bedtime     sodium chloride (PF)  3 mL Intracatheter Q8H       Data   Results for orders placed or performed during the hospital encounter of 11/25/19 (from the past 24 hour(s))   Basic metabolic panel   Result Value Ref Range    Sodium 135 133 - 144 mmol/L    Potassium 2.9 (L) 3.4 - 5.3 mmol/L    Chloride 95 94 - 109 mmol/L    Carbon Dioxide 36 (H) 20 - 32 mmol/L    Anion Gap 4 3 - 14 mmol/L    Glucose 88 70 - 99 mg/dL    Urea Nitrogen 11 7 - 30 mg/dL     PREFERRED METER, Disp: 100 strip, Rfl: 11    blood sugar diagnostic Strp, To check BG 2 times daily, to use with insurance preferred meter, Disp: 100 strip, Rfl: 11    blood-glucose meter (TRUE METRIX AIR GLUCOSE METER[place] [facility] [place] TO CHECK BG 1 TIMES DAILY, TO USE WITH INSURANCE PREFERRED METER, Disp: 1 each, Rfl: 0    blood-glucose meter kit, To check BG 2 times daily, to use with insurance preferred meter, Disp: 1 each, Rfl: 0    blood-glucose meter kit, Use as instructed, Disp: 1 each, Rfl: 0    CASCARA SAGRADA ORAL, Take 450 mg by mouth., Disp: , Rfl:     diclofenac (VOLTAREN) 50 MG EC tablet, Take 1 tablet (50 mg total) by mouth 2 (two) times daily., Disp: 60 tablet, Rfl: 2    esomeprazole (NEXIUM) 40 MG capsule, Take 1 capsule (40 mg total) by mouth before breakfast., Disp: 30 capsule, Rfl: 11    fexofenadine (ALLEGRA) 180 MG tablet, Take 180 mg by mouth., Disp: , Rfl:     fluconazole (DIFLUCAN) 150 MG Tab, Take 1 tablet (150 mg total) by mouth once a week., Disp: 12 tablet, Rfl: 1    fluticasone propionate (FLONASE) 50 mcg/actuation nasal spray, 2 sprays (100 mcg total) by Each Nostril route Daily. For Allergies, Disp: 16 g, Rfl: 4    lancets 33 gauge Misc, TO CHECK BG 1 TIMES DAILY, TO USE WITH INSURANCE PREFERRED METER, Disp: 100 each, Rfl: 11    lancets Misc, To check BG 2 times daily, to use with insurance preferred meter, Disp: 100 each, Rfl: 11    lysine (L-LYSINE) 500 mg Tab, Take 500 mg by mouth., Disp: , Rfl:     melatonin 1 mg Tab, Take 1 mg by mouth., Disp: , Rfl:     metFORMIN (GLUCOPHAGE) 500 MG tablet, Take 2 tablets (1,000 mg total) by mouth 2 (two) times daily with meals., Disp: 360 tablet, Rfl: 1    methocarbamoL (ROBAXIN) 500 MG Tab, Take 2 tablets (1,000 mg total) by mouth 3 (three) times daily as needed (back pain)., Disp: 30 tablet, Rfl: 0    multivitamin capsule, Take 1 capsule by mouth once daily., Disp: , Rfl:     psyllium husk 0.4 gram Cap, Take by mouth., Disp: , Rfl:     saw palmetto  Creatinine 0.62 0.52 - 1.04 mg/dL    GFR Estimate 85 >60 mL/min/[1.73_m2]    GFR Estimate If Black >90 >60 mL/min/[1.73_m2]    Calcium 9.0 8.5 - 10.1 mg/dL                   fruit 450 mg Cap, Take 3 tablets by mouth every evening., Disp: , Rfl:     traMADoL (ULTRAM) 50 mg tablet, Take 50 mg by mouth every 6 (six) hours as needed for Pain. (Patient not taking: Reported on 8/30/2024), Disp: , Rfl:     Lab Results   Component Value Date    WBC 6.09 06/15/2022    HGB 14.8 06/15/2022    HCT 43.1 06/15/2022     06/15/2022    CHOL 134 05/13/2024    TRIG 127 05/13/2024    HDL 36 (L) 05/13/2024    ALT 21 05/13/2024    AST 21 05/13/2024     05/13/2024    K 4.2 05/13/2024     05/13/2024    CREATININE 0.9 05/13/2024    BUN 22 05/13/2024    CO2 20 (L) 05/13/2024    TSH 0.786 05/13/2024    PSA 3.3 12/06/2022    HGBA1C 6.8 (H) 05/13/2024       Review of Systems   Constitutional:  Negative for fatigue and fever.   HENT: Negative.  Negative for congestion, sneezing and sore throat.    Eyes: Negative.    Respiratory:  Negative for cough, shortness of breath and wheezing.    Cardiovascular:  Negative for chest pain, palpitations and leg swelling.   Gastrointestinal:  Negative for abdominal pain, nausea and vomiting.   Genitourinary: Negative.    Musculoskeletal: Negative.  Negative for arthralgias.   Skin: Negative.  Negative for rash.   Neurological:  Positive for dizziness. Negative for weakness, light-headedness, numbness and headaches.   Hematological: Negative.    Psychiatric/Behavioral: Negative.     All other systems reviewed and are negative.      Objective:      Physical Exam  Constitutional:       Appearance: Normal appearance.   HENT:      Head: Normocephalic and atraumatic.      Nose: Nose normal.   Eyes:      Extraocular Movements: Extraocular movements intact.   Cardiovascular:      Rate and Rhythm: Normal rate and regular rhythm.   Pulmonary:      Effort: Pulmonary effort is normal.      Breath sounds: Normal breath sounds.   Musculoskeletal:         General: Normal range of motion.      Cervical back: Normal range of motion.   Skin:     General: Skin is warm and dry.    Neurological:      General: No focal deficit present.      Mental Status: He is alert and oriented to person, place, and time.   Psychiatric:         Mood and Affect: Mood normal.         Assessment:       1. First degree AV block    2. Dizziness    3. Type 2 diabetes mellitus with other specified complication, without long-term current use of insulin        Plan:       Cali was seen today for irregular heart beat.    Diagnoses and all orders for this visit:    First degree AV block  - appears to be asymptomatic.  Holter monitor as ordered given reported dizziness to assess for other possible arrhythmias.  -     Cardiac Monitor - 3-15 Day Adult (Cupid Only); Future    Dizziness  - Orthostatics benign in clinic  -     Cardiac Monitor - 3-15 Day Adult (Cupid Only); Future  - increase fluids    Type 2 diabetes mellitus with other specified complication, without long-term current use of insulin  - A1c 6.8 05/2024  - continue current regimen  - discussed medication compliance   - yearly eye exam recommended  - LDL goal <70  - discussed importance of frequent foot exam      Keep upcoming appointment with Dr. Villegas    Portions of this note were dictated using voice recognition software and may contain dictation related errors in spelling / grammar / syntax not discovered on text review.     Gina Sweeney PA-C

## 2024-09-03 ENCOUNTER — PATIENT OUTREACH (OUTPATIENT)
Dept: ADMINISTRATIVE | Facility: HOSPITAL | Age: 74
End: 2024-09-03
Payer: MEDICARE

## 2024-09-03 NOTE — PROGRESS NOTES
Population Health Chart Review & Patient Outreach Details      Additional [facility] Notes:               Updates Requested / Reviewed:      Updated Care Coordination Note, Care Everywhere, , Care Team Updated, and Immunizations Reconciliation Completed or Queried: [facility] Topics Overdue:      [facility] Score: 0     Patient is not due for any topics at this time.    Influenza Vaccine  Shingles/Zoster Vaccine  RSV Vaccine                  Health Maintenance Topic(s) Outreach Outcomes & Actions Taken:    Eye Exam - Outreach Outcomes & Actions Taken  : Eye Camera Scheduled or Optometry/Ophthalmology Referral Placed/Appt Scheduled

## 2024-09-05 ENCOUNTER — TELEPHONE (OUTPATIENT)
Dept: FAMILY MEDICINE | Facility: CLINIC | Age: 74
End: 2024-09-05
Payer: MEDICARE

## 2024-09-05 DIAGNOSIS — I44.0 FIRST DEGREE AV BLOCK: Primary | ICD-10-CM

## 2024-09-05 NOTE — TELEPHONE ENCOUNTER
----- Message from Evie Viveros RN sent at 2024 11:58 AM CDT -----  Regardin/30 EKG Order  The EKG performed on 24 is missing an order.  Please place an order so that the EKG can be read in [place].    Thank you,  Evie Viveros RN  [facility]   [facility]  3rd Floor Cardiology Clinic  [phone]/ [id number]

## 2024-09-06 ENCOUNTER — HOSPITAL ENCOUNTER (OUTPATIENT)
Dept: CARDIOLOGY | Facility: CLINIC | Age: 74
Discharge: HOME | End: 2024-09-06
Payer: MEDICARE

## 2024-09-06 DIAGNOSIS — R42 DIZZINESS: ICD-10-CM

## 2024-09-06 DIAGNOSIS — I44.0 FIRST DEGREE AV BLOCK: ICD-10-CM

## 2024-10-04 ENCOUNTER — TELEPHONE (OUTPATIENT)
Dept: FAMILY MEDICINE | Facility: CLINIC | Age: 74
End: 2024-10-04
Payer: MEDICARE

## 2024-10-04 DIAGNOSIS — I44.0 1ST DEGREE AV BLOCK: Primary | ICD-10-CM

## 2024-10-04 DIAGNOSIS — I44.1 SECOND DEGREE AV BLOCK: ICD-10-CM

## 2024-10-04 DIAGNOSIS — R00.1 BRADYCARDIA: ICD-10-CM

## 2024-10-04 NOTE — TELEPHONE ENCOUNTER
Pt sched cardiology ref Nov 15----- Message from Gina Sweeney PA-C sent at 10/4/2024  9:19 AM CDT -----  Please notify patient that his Holter monitor continues to show a first-degree AV block.  As discussed at his appointment this is not a concerning finding unless he is symptomatic.  A 2nd degree AV block was also noted.  This also is not a super concerning finding unless you are symptomatic.  However, his heart rate seem to drop fairly low to 26 beats.  I would recommend that he follow up with Cardiology for further assessment.  I am going to place a referral if he can help him get this scheduled.  Thanks

## 2024-10-09 RX ORDER — DICLOFENAC SODIUM 50 MG/1
50 TABLET, DELAYED RELEASE ORAL 2 TIMES DAILY
Qty: 60 TABLET | Refills: 2 | Status: SHIPPED | OUTPATIENT
Start: 2024-10-09

## 2024-10-09 NOTE — TELEPHONE ENCOUNTER
Refill Routing Note   Medication(s) are not appropriate for processing by [facility] for the following reason(s):        Outside of protocol    [facility] action(s):  Route               Appointments  past 12m or future 3m with PCP    Date Provider   Last Visit   5/14/2024 Jame Villegas, DO   Next Visit   11/19/2024 Jame Villegas, DO   ED visits in past 90 days: 0        Note composed:11:31 AM 10/09/2024

## 2024-11-12 ENCOUNTER — LAB VISIT (OUTPATIENT)
Dept: LAB | Facility: HOSPITAL | Age: 74
End: 2024-11-12
Attending: STUDENT IN AN ORGANIZED HEALTH CARE EDUCATION/TRAINING PROGRAM
Payer: MEDICARE

## 2024-11-12 ENCOUNTER — PATIENT MESSAGE (OUTPATIENT)
Dept: FAMILY MEDICINE | Facility: CLINIC | Age: 74
End: 2024-11-12
Payer: MEDICARE

## 2024-11-12 DIAGNOSIS — E78.5 HYPERLIPIDEMIA, UNSPECIFIED HYPERLIPIDEMIA TYPE: ICD-10-CM

## 2024-11-12 DIAGNOSIS — N40.0 BENIGN PROSTATIC HYPERPLASIA, UNSPECIFIED WHETHER LOWER URINARY TRACT SYMPTOMS PRESENT: ICD-10-CM

## 2024-11-12 DIAGNOSIS — Z00.00 ENCOUNTER FOR ANNUAL GENERAL MEDICAL EXAMINATION WITHOUT ABNORMAL FINDINGS IN ADULT: ICD-10-CM

## 2024-11-12 LAB
ALBUMIN SERPL BCP-MCNC: 3.7 G/DL (ref 3.5–5.2)
ALP SERPL-CCNC: 71 U/L (ref 40–150)
ALT SERPL W/O P-5'-P-CCNC: 13 U/L (ref 10–44)
ANION GAP SERPL CALC-SCNC: 8 MMOL/L (ref 8–16)
AST SERPL-CCNC: 17 U/L (ref 10–40)
BILIRUB SERPL-MCNC: 0.4 MG/DL (ref 0.1–1)
BUN SERPL-MCNC: 22 MG/DL (ref 8–23)
CALCIUM SERPL-MCNC: 8.9 MG/DL (ref 8.7–10.5)
CHLORIDE SERPL-SCNC: 110 MMOL/L (ref 95–110)
CHOLEST SERPL-MCNC: 110 MG/DL (ref 120–199)
CHOLEST/HDLC SERPL: 3.1 {RATIO} (ref 2–5)
CO2 SERPL-SCNC: 21 MMOL/L (ref 23–29)
CREAT SERPL-MCNC: 1.1 MG/DL (ref 0.5–1.4)
ERYTHROCYTE [DISTWIDTH] IN BLOOD BY AUTOMATED COUNT: 13.5 % (ref 11.5–14.5)
EST. GFR  (NO RACE VARIABLE): >60 ML/MIN/1.73 M^2
ESTIMATED AVG GLUCOSE: 114 MG/DL (ref 68–131)
GLUCOSE SERPL-MCNC: 111 MG/DL (ref 70–110)
HBA1C MFR BLD: 5.6 % (ref 4–5.6)
HCT VFR BLD AUTO: 40.2 % (ref 40–54)
HDLC SERPL-MCNC: 35 MG/DL (ref 40–75)
HDLC SERPL: 31.8 % (ref 20–50)
HGB BLD-MCNC: 13.2 G/DL (ref 14–18)
LDLC SERPL CALC-MCNC: 56.8 MG/DL (ref 63–159)
MCH RBC QN AUTO: 30.8 PG (ref 27–31)
MCHC RBC AUTO-ENTMCNC: 32.8 G/DL (ref 32–36)
MCV RBC AUTO: 94 FL (ref 82–98)
NONHDLC SERPL-MCNC: 75 MG/DL
PLATELET # BLD AUTO: 182 K/UL (ref 150–450)
PMV BLD AUTO: 10.5 FL (ref 9.2–12.9)
POTASSIUM SERPL-SCNC: 4.3 MMOL/L (ref 3.5–5.1)
PROT SERPL-MCNC: 6 G/DL (ref 6–8.4)
RBC # BLD AUTO: 4.28 M/UL (ref 4.6–6.2)
SODIUM SERPL-SCNC: 139 MMOL/L (ref 136–145)
TRIGL SERPL-MCNC: 91 MG/DL (ref 30–150)
WBC # BLD AUTO: 5.63 K/UL (ref 3.9–12.7)

## 2024-11-12 PROCEDURE — 85027 COMPLETE CBC AUTOMATED: CPT | Performed by: STUDENT IN AN ORGANIZED HEALTH CARE EDUCATION/TRAINING PROGRAM

## 2024-11-12 PROCEDURE — 80053 COMPREHEN METABOLIC PANEL: CPT | Performed by: STUDENT IN AN ORGANIZED HEALTH CARE EDUCATION/TRAINING PROGRAM

## 2024-11-12 PROCEDURE — 83036 HEMOGLOBIN GLYCOSYLATED A1C: CPT | Performed by: STUDENT IN AN ORGANIZED HEALTH CARE EDUCATION/TRAINING PROGRAM

## 2024-11-12 PROCEDURE — 36415 COLL VENOUS BLD VENIPUNCTURE: CPT | Mod: PO | Performed by: STUDENT IN AN ORGANIZED HEALTH CARE EDUCATION/TRAINING PROGRAM

## 2024-11-12 PROCEDURE — 84153 ASSAY OF PSA TOTAL: CPT | Performed by: STUDENT IN AN ORGANIZED HEALTH CARE EDUCATION/TRAINING PROGRAM

## 2024-11-12 PROCEDURE — 80061 LIPID PANEL: CPT | Performed by: STUDENT IN AN ORGANIZED HEALTH CARE EDUCATION/TRAINING PROGRAM

## 2024-11-13 LAB
PROSTATE SPECIFIC ANTIGEN, TOTAL: 5.4 NG/ML (ref 0–4)
PSA FREE MFR SERPL: 33.7 %
PSA FREE SERPL-MCNC: 1.82 NG/ML (ref 0–1.5)

## 2024-11-15 ENCOUNTER — OFFICE VISIT (OUTPATIENT)
Dept: CARDIOLOGY | Facility: CLINIC | Age: 74
End: 2024-11-15
Payer: MEDICARE

## 2024-11-15 VITALS
SYSTOLIC BLOOD PRESSURE: 132 MMHG | HEART RATE: 85 BPM | BODY MASS INDEX: 33.8 KG/M2 | WEIGHT: 215.38 LBS | OXYGEN SATURATION: 95 % | DIASTOLIC BLOOD PRESSURE: 82 MMHG | HEIGHT: 67 IN

## 2024-11-15 DIAGNOSIS — E78.5 DYSLIPIDEMIA: ICD-10-CM

## 2024-11-15 DIAGNOSIS — I44.0 1ST DEGREE AV BLOCK: ICD-10-CM

## 2024-11-15 DIAGNOSIS — I44.1 SECOND DEGREE AV BLOCK: Primary | ICD-10-CM

## 2024-11-15 DIAGNOSIS — R00.1 BRADYCARDIA: ICD-10-CM

## 2024-11-15 DIAGNOSIS — R42 DIZZINESS: ICD-10-CM

## 2024-11-15 DIAGNOSIS — M79.606 PAIN OF LOWER EXTREMITY, UNSPECIFIED LATERALITY: ICD-10-CM

## 2024-11-15 PROCEDURE — 99999 PR PBB SHADOW E&M-EST. PATIENT-LVL V: CPT | Mod: PBBFAC,,, | Performed by: INTERNAL MEDICINE

## 2024-11-15 PROCEDURE — 99215 OFFICE O/P EST HI 40 MIN: CPT | Mod: PBBFAC,PN | Performed by: INTERNAL MEDICINE

## 2024-11-15 NOTE — PROGRESS NOTES
Subjective:    Patient ID:  Cali Ho is a 74 y.o. male patient here for evaluation event monitor results      History of Present Illness:     74-year-old male with a past medical history of hyperlipidemia, diabetes on medications referred here from primary care for evaluation of event monitor results.  He had first-degree AV block on the EKG.  Had event monitor which showed Wenckebach during hours of sleep, rare ectopy.  No sustained arrhythmia noted.  Patient has intermittent dizziness which is postural when bending followed or when standing from sitting position.  Physically active at baseline and denies exertional angina.  Denies syncope.        Review of patient's allergies indicates:   Allergen Reactions    Dilaudid [hydromorphone]     Gabapentin        Past Medical History:   Diagnosis Date    Erectile dysfunction     Hyperlipidemia     Type 2 diabetes mellitus      Past Surgical History:   Procedure Laterality Date    HEMORRHOID SURGERY       Social History     Tobacco Use    Smoking status: Never    Smokeless tobacco: Never   Substance Use Topics    Alcohol use: Yes     Alcohol/week: 2.0 standard drinks of alcohol     Types: 2 Cans of beer per week        Review of Systems   Negative except as mentioned in HPI         Objective        Vitals:    11/15/24 1312   BP: 132/82   Pulse: 85       LIPIDS - LAST 2   Lab Results   Component Value Date    CHOL 110 (L) 11/12/2024    CHOL 134 05/13/2024    HDL 35 (L) 11/12/2024    HDL 36 (L) 05/13/2024    LDLCALC 56.8 (L) 11/12/2024    LDLCALC 72.6 05/13/2024    TRIG 91 11/12/2024    TRIG 127 05/13/2024    CHOLHDL 31.8 11/12/2024    CHOLHDL 26.9 05/13/2024       CBC - LAST 2  Lab Results   Component Value Date    WBC 5.63 11/12/2024    WBC 6.09 06/15/2022    RBC 4.28 (L) 11/12/2024    RBC 4.91 06/15/2022    HGB 13.2 (L) 11/12/2024    HGB 14.8 06/15/2022    HCT 40.2 11/12/2024    HCT 43.1 06/15/2022    MCV 94 11/12/2024    MCV 88 06/15/2022    MCH 30.8 11/12/2024     "MCH 30.1 06/15/2022    MCHC 32.8 11/12/2024    MCHC 34.3 06/15/2022    RDW 13.5 11/12/2024    RDW 13.6 06/15/2022     11/12/2024     06/15/2022    MPV 10.5 11/12/2024    MPV 10.1 06/15/2022    GRAN 3.6 06/15/2022    GRAN 58.3 06/15/2022    LYMPH 1.9 06/15/2022    LYMPH 31.2 06/15/2022    MONO 0.5 06/15/2022    MONO 8.9 06/15/2022    BASO 0.02 06/15/2022    BASO 0.02 02/01/2022    NRBC 0 06/15/2022    NRBC 0 02/01/2022       CHEMISTRY & LIVER FUNCTION - LAST 2  Lab Results   Component Value Date     11/12/2024     05/13/2024    K 4.3 11/12/2024    K 4.2 05/13/2024     11/12/2024     05/13/2024    CO2 21 (L) 11/12/2024    CO2 20 (L) 05/13/2024    ANIONGAP 8 11/12/2024    ANIONGAP 9 05/13/2024    BUN 22 11/12/2024    BUN 22 05/13/2024    CREATININE 1.1 11/12/2024    CREATININE 0.9 05/13/2024     (H) 11/12/2024     (H) 05/13/2024    CALCIUM 8.9 11/12/2024    CALCIUM 9.4 05/13/2024    ALBUMIN 3.7 11/12/2024    ALBUMIN 3.8 05/13/2024    PROT 6.0 11/12/2024    PROT 6.5 05/13/2024    ALKPHOS 71 11/12/2024    ALKPHOS 68 05/13/2024    ALT 13 11/12/2024    ALT 21 05/13/2024    AST 17 11/12/2024    AST 21 05/13/2024    BILITOT 0.4 11/12/2024    BILITOT 0.5 05/13/2024        CARDIAC PROFILE - LAST 2  No results found for: "BNP", "CPK", "CPKMB", "LDH", "TROPONINI", "TROPONINIHS"     COAGULATION - LAST 2  No results found for: "LABPT", "INR", "APTT"    ENDOCRINE & PSA - LAST 2  Lab Results   Component Value Date    HGBA1C 5.6 11/12/2024    HGBA1C 6.8 (H) 05/13/2024    TSH 0.786 05/13/2024    TSH 0.822 07/02/2020    PSA 3.3 12/06/2022        ECHOCARDIOGRAM RESULTS  No results found for this or any previous visit.      CURRENT/PREVIOUS VISIT EKG  Results for orders placed or performed in visit on 08/30/24   IN OFFICE EKG 12-LEAD (to [place])    Collection Time: 08/30/24  2:11 PM   Result Value Ref Range    QRS Duration 80 ms    [facility] QTC Calculation 386 ms    Narrative    Test Reason : " I44.0,    Vent. Rate : 075 BPM     Atrial Rate : 075 BPM     P-R Int : 310 ms          QRS Dur : 080 ms      QT Int : 346 ms       P-R-T Axes : 034 041 036 degrees     QTc Int : 386 ms    Sinus rhythm with marked sinus arrythmia with 1st degree A-V block  Otherwise normal ECG  No previous ECGs available  Confirmed by Ryan Dhaliwal MD ([id number]) on 9/6/2024 4:05:20 PM    Referred By: RADHA ONOFRE           Confirmed By:Ryan Dhaliwal MD     No valid procedures specified.   No results found for this or any previous visit.    No valid procedures specified.          PREVIOUS STRESS TEST              PREVIOUS ANGIOGRAM        PHYSICAL EXAM    CONSTITUTIONAL: Well built, well nourished in no apparent distress  HEENT: No pallor  NECK: no JVD  LUNGS: CTA b/l  HEART: regular rate and rhythm, S1, S2 normal, no murmur   ABDOMEN:  Nondistended  EXTREMITIES: No edema  NEURO: AAO X 3   SKIN:  No rash  Psych:  Normal affect    I HAVE REVIEWED :    The vital signs, nurses notes, and all the pertinent radiology and labs.        Current Outpatient Medications   Medication Instructions    atorvastatin (LIPITOR) 10 mg, Oral, Nightly, For Cholesterol    azelastine (ASTELIN) 137 mcg, Nasal, 2 times daily    blood sugar diagnostic (TRUE METRIX GLUCOSE TEST STRIP) Strp TO CHECK BG 1 TIMES DAILY, TO USE WITH INSURANCE PREFERRED METER    blood sugar diagnostic Strp To check BG 2 times daily, to use with insurance preferred meter    blood-glucose meter (TRUE METRIX AIR GLUCOSE METER) Misc TO CHECK BG 1 TIMES DAILY, TO USE WITH INSURANCE PREFERRED METER    BETTYE SAGANTONIO ORAL 450 mg, Oral    diclofenac (VOLTAREN) 50 mg, Oral, 2 times daily    esomeprazole (NEXIUM) 40 mg, Oral, Before breakfast    fexofenadine (ALLEGRA) 180 mg, Oral    fluconazole (DIFLUCAN) 150 mg, Oral, Weekly    fluticasone propionate (FLONASE) 100 mcg, Each Nostril, Daily, For Allergies    lancets 33 gauge Misc TO CHECK BG 1 TIMES DAILY, TO USE WITH INSURANCE PREFERRED METER     lancets Misc To check BG 2 times daily, to use with insurance preferred meter    lysine (L-LYSINE) 500 mg, Oral    melatonin (MELATIN) 1 mg, Oral    metFORMIN (GLUCOPHAGE) 1,000 mg, Oral, 2 times daily with meals    methocarbamoL (ROBAXIN) 1,000 mg, Oral, 3 times daily PRN    multivitamin capsule 1 capsule, Oral, Daily    psyllium husk 0.4 gram Cap Oral    saw palmetto fruit 450 mg Cap 3 tablets, Oral, Nightly    traMADoL (ULTRAM) 50 mg, Every 6 hours PRN        ECG reviewed by me: SR with first-degree AV block  Assessment & Plan     First-degree AV block and Wenckebach (during hours of sleep, likely vagally mediated)-  no pertinent symptoms related to these.  Average heart rate about 70 beats per minute on the monitor and max heart rate 128 beats per minute.  TSH normal.  obtain echocardiogram. monitor for now.     Intermittent dizziness-postural-appears to be orthostasis related.  Advised to avoid dehydration, sudden position changes    Intermittent lower extremity heaviness with walking- obtain DENNIS    Dyslipidemia-well controlled-continue statin    Diabetes-on metformin            Follow up in about 2 months (around 1/15/2025).

## 2024-11-19 ENCOUNTER — OFFICE VISIT (OUTPATIENT)
Dept: FAMILY MEDICINE | Facility: CLINIC | Age: 74
End: 2024-11-19
Payer: MEDICARE

## 2024-11-19 ENCOUNTER — PATIENT MESSAGE (OUTPATIENT)
Dept: ADMINISTRATIVE | Facility: OTHER | Age: 74
End: 2024-11-19
Payer: MEDICARE

## 2024-11-19 VITALS
HEART RATE: 86 BPM | OXYGEN SATURATION: 95 % | HEIGHT: 67 IN | BODY MASS INDEX: 33.46 KG/M2 | SYSTOLIC BLOOD PRESSURE: 126 MMHG | WEIGHT: 213.19 LBS | DIASTOLIC BLOOD PRESSURE: 66 MMHG

## 2024-11-19 DIAGNOSIS — E11.9 TYPE 2 DIABETES MELLITUS WITHOUT COMPLICATION, WITHOUT LONG-TERM CURRENT USE OF INSULIN: ICD-10-CM

## 2024-11-19 DIAGNOSIS — K21.9 GASTROESOPHAGEAL REFLUX DISEASE, UNSPECIFIED WHETHER ESOPHAGITIS PRESENT: Primary | ICD-10-CM

## 2024-11-19 DIAGNOSIS — E78.2 MIXED HYPERLIPIDEMIA: ICD-10-CM

## 2024-11-19 DIAGNOSIS — R97.20 ELEVATED PSA MEASUREMENT: ICD-10-CM

## 2024-11-19 DIAGNOSIS — R97.20 ABNORMAL PSA: Primary | ICD-10-CM

## 2024-11-19 DIAGNOSIS — N40.0 BENIGN PROSTATIC HYPERPLASIA, UNSPECIFIED WHETHER LOWER URINARY TRACT SYMPTOMS PRESENT: ICD-10-CM

## 2024-11-19 DIAGNOSIS — H53.9 VISUAL DISTURBANCES: ICD-10-CM

## 2024-11-19 PROCEDURE — 99999 PR PBB SHADOW E&M-EST. PATIENT-LVL V: CPT | Mod: PBBFAC,,, | Performed by: STUDENT IN AN ORGANIZED HEALTH CARE EDUCATION/TRAINING PROGRAM

## 2024-11-19 PROCEDURE — 99215 OFFICE O/P EST HI 40 MIN: CPT | Mod: PBBFAC,PO | Performed by: STUDENT IN AN ORGANIZED HEALTH CARE EDUCATION/TRAINING PROGRAM

## 2024-11-19 PROCEDURE — G2211 COMPLEX E/M VISIT ADD ON: HCPCS | Mod: S$PBB,,, | Performed by: STUDENT IN AN ORGANIZED HEALTH CARE EDUCATION/TRAINING PROGRAM

## 2024-11-19 PROCEDURE — 99214 OFFICE O/P EST MOD 30 MIN: CPT | Mod: S$PBB,,, | Performed by: STUDENT IN AN ORGANIZED HEALTH CARE EDUCATION/TRAINING PROGRAM

## 2024-11-19 RX ORDER — ESOMEPRAZOLE MAGNESIUM 40 MG/1
40 CAPSULE, DELAYED RELEASE ORAL
Qty: 30 CAPSULE | Refills: 11 | Status: SHIPPED | OUTPATIENT
Start: 2024-11-19 | End: 2025-11-19

## 2024-11-19 RX ORDER — METFORMIN HYDROCHLORIDE 500 MG/1
1000 TABLET ORAL 2 TIMES DAILY WITH MEALS
Qty: 360 TABLET | Refills: 1 | Status: SHIPPED | OUTPATIENT
Start: 2024-11-19

## 2024-11-19 NOTE — PROGRESS NOTES
Please inform patient of the following:    Your labs have been reviewed.  Most labs are within normal range.  Your PSA levels are elevated however.  This is a change from approximately 3 years ago.  I do recommend follow up with Urology for further evaluation of this result. A referral has been placed    If you have any further questions please contact our office    Sincerely,    Basia Villegas, DO

## 2024-11-20 ENCOUNTER — HOSPITAL ENCOUNTER (OUTPATIENT)
Dept: CARDIOLOGY | Facility: HOSPITAL | Age: 74
Discharge: HOME | End: 2024-11-20
Attending: INTERNAL MEDICINE
Payer: MEDICARE

## 2024-11-20 VITALS — HEIGHT: 67 IN | BODY MASS INDEX: 33.74 KG/M2 | WEIGHT: 215 LBS

## 2024-11-20 DIAGNOSIS — R00.1 BRADYCARDIA: ICD-10-CM

## 2024-11-20 LAB
AORTIC ROOT ANNULUS: 4.4 CM
AORTIC VALVE CUSP SEPERATION: 1.7 CM
APICAL FOUR CHAMBER EJECTION FRACTION: 64 %
ASCENDING AORTA: 4.6 CM
AV INDEX (PROSTH): 0.71
AV MEAN GRADIENT: 3 MMHG
AV PEAK GRADIENT: 5.8 MMHG
AV REGURGITATION PRESSURE HALF TIME: 630 MS
AV VALVE AREA BY VELOCITY RATIO: 2.3 CM²
AV VALVE AREA: 2.4 CM²
AV VELOCITY RATIO: 0.67
BSA FOR ECHO PROCEDURE: 2.15 M2
CV ECHO LV RWT: 0.63 CM
DOP CALC AO PEAK VEL: 1.2 M/S
DOP CALC AO VTI: 20.5 CM
DOP CALC LVOT AREA: 3.5 CM2
DOP CALC LVOT DIAMETER: 2.1 CM
DOP CALC LVOT PEAK VEL: 0.8 M/S
DOP CALC LVOT STROKE VOLUME: 50.2 CM3
DOP CALCLVOT PEAK VEL VTI: 14.5 CM
E WAVE DECELERATION TIME: 206 MSEC
E/A RATIO: 0.79
E/E' RATIO: 5.3 M/S
ECHO LV POSTERIOR WALL: 1.3 CM (ref 0.6–1.1)
FRACTIONAL SHORTENING: 31.7 % (ref 28–44)
INTERVENTRICULAR SEPTUM: 1.4 CM (ref 0.6–1.1)
IVRT: 106 MSEC
LEFT ATRIUM SIZE: 4.3 CM
LEFT INTERNAL DIMENSION IN SYSTOLE: 2.8 CM (ref 2.1–4)
LEFT VENTRICLE DIASTOLIC VOLUME INDEX: 34.7 ML/M2
LEFT VENTRICLE DIASTOLIC VOLUME: 72.52 ML
LEFT VENTRICLE END DIASTOLIC VOLUME APICAL 4 CHAMBER: 97.8 ML
LEFT VENTRICLE MASS INDEX: 98 G/M2
LEFT VENTRICLE SYSTOLIC VOLUME INDEX: 13.9 ML/M2
LEFT VENTRICLE SYSTOLIC VOLUME: 29.03 ML
LEFT VENTRICULAR INTERNAL DIMENSION IN DIASTOLE: 4.1 CM (ref 3.5–6)
LEFT VENTRICULAR MASS: 204.9 G
LV LATERAL E/E' RATIO: 4.07 M/S
LV SEPTAL E/E' RATIO: 7.63 M/S
LVED V (TEICH): 72.52 ML
LVES V (TEICH): 29.03 ML
LVOT MG: 1 MMHG
LVOT MV: 0.55 CM/S
MV PEAK A VEL: 0.77 M/S
MV PEAK E VEL: 0.61 M/S
MV STENOSIS PRESSURE HALF TIME: 62 MS
MV VALVE AREA P 1/2 METHOD: 3.55 CM2
PISA AR MAX VEL: 2.39 M/S
RIGHT VENTRICULAR END-DIASTOLIC DIMENSION: 2.16 CM
TDI LATERAL: 0.15 M/S
TDI SEPTAL: 0.08 M/S
TDI: 0.12 M/S
Z-SCORE OF LEFT VENTRICULAR DIMENSION IN END DIASTOLE: -4.53
Z-SCORE OF LEFT VENTRICULAR DIMENSION IN END SYSTOLE: -2.7

## 2024-11-20 PROCEDURE — 93306 TTE W/DOPPLER COMPLETE: CPT

## 2024-11-21 ENCOUNTER — PATIENT MESSAGE (OUTPATIENT)
Dept: ADMINISTRATIVE | Facility: OTHER | Age: 74
End: 2024-11-21
Payer: MEDICARE

## 2024-11-22 ENCOUNTER — TELEPHONE (OUTPATIENT)
Dept: FAMILY MEDICINE | Facility: CLINIC | Age: 74
End: 2024-11-22
Payer: MEDICARE

## 2024-11-22 ENCOUNTER — PATIENT MESSAGE (OUTPATIENT)
Dept: FAMILY MEDICINE | Facility: CLINIC | Age: 74
End: 2024-11-22
Payer: MEDICARE

## 2024-11-22 NOTE — TELEPHONE ENCOUNTER
Handled in a portal message    ----- Message from Arleen sent at 11/22/2024  2:49 PM CST -----  Contact: self  Type: Needs Medical Advice  Who Called:  pt  Best Call Back Number: [phone]   Additional Information: would like to know if the fax he sent was received and faxed to dr akers's office.please call

## 2024-11-25 NOTE — TELEPHONE ENCOUNTER
Pt notified that Documentation requesting is complete an illegible in sections voiced understanding and stated that he would get more information from podiatry on paperwork requirement and try to get a more legible copy for completion.  No further intervention needed at this time.

## 2024-12-09 ENCOUNTER — TELEPHONE (OUTPATIENT)
Dept: CARDIOLOGY | Facility: HOSPITAL | Age: 74
End: 2024-12-09

## 2024-12-10 ENCOUNTER — CLINICAL SUPPORT (OUTPATIENT)
Dept: CARDIOLOGY | Facility: HOSPITAL | Age: 74
End: 2024-12-10
Attending: INTERNAL MEDICINE
Payer: MEDICARE

## 2024-12-10 DIAGNOSIS — M79.606 PAIN OF LOWER EXTREMITY, UNSPECIFIED LATERALITY: ICD-10-CM

## 2024-12-10 PROCEDURE — 93923 UPR/LXTR ART STDY 3+ LVLS: CPT

## 2024-12-10 PROCEDURE — 93923 UPR/LXTR ART STDY 3+ LVLS: CPT | Mod: 26,,, | Performed by: INTERNAL MEDICINE

## 2024-12-13 LAB
LEFT ABI: 1.28
LEFT ARM BP: 111 MMHG
LEFT CALF BP: 142 MMHG
LEFT POSTERIOR TIBIAL: 146 MMHG
LEFT UPPER LEG BP: 165 MMHG
RIGHT ABI: 1.39
RIGHT ARM BP: 114 MMHG
RIGHT CALF BP: 163 MMHG
RIGHT POSTERIOR TIBIAL: 159 MMHG
RIGHT UPPER LEG BP: 177 MMHG

## 2025-01-10 ENCOUNTER — TELEPHONE (OUTPATIENT)
Dept: FAMILY MEDICINE | Facility: CLINIC | Age: 75
End: 2025-01-10
Payer: MEDICARE

## 2025-01-10 NOTE — TELEPHONE ENCOUNTER
----- Message from [employer] sent at 1/10/2025  4:06 PM CST -----  Type:  Sooner Apoointment Request    Caller is requesting a sooner appointment.  Caller declined first available appointment listed below.  Caller will not accept being placed on the waitlist and is requesting a message be sent to doctor.  Name of Caller:the patient  When is the first available appointment?2/24  Symptoms: abdominal cramps/pt states moderate diverticulitis per US  Would the patient rather a call back or a response via [facility]? call back  Best Call Back Number:[phone]   Additional Information: Thanks

## 2025-01-13 ENCOUNTER — OFFICE VISIT (OUTPATIENT)
Dept: OPHTHALMOLOGY | Facility: CLINIC | Age: 75
End: 2025-01-13
Payer: MEDICARE

## 2025-01-13 DIAGNOSIS — E11.9 DIABETES MELLITUS TYPE 2 WITHOUT RETINOPATHY: Primary | ICD-10-CM

## 2025-01-13 DIAGNOSIS — H25.13 NUCLEAR SCLEROTIC CATARACT, BILATERAL: ICD-10-CM

## 2025-01-13 DIAGNOSIS — H40.013 OAG (OPEN ANGLE GLAUCOMA) SUSPECT, LOW RISK, BILATERAL: ICD-10-CM

## 2025-01-13 PROCEDURE — 99999 PR PBB SHADOW E&M-EST. PATIENT-LVL III: CPT | Mod: PBBFAC,,, | Performed by: OPHTHALMOLOGY

## 2025-01-13 PROCEDURE — 99213 OFFICE O/P EST LOW 20 MIN: CPT | Mod: PBBFAC,PO | Performed by: OPHTHALMOLOGY

## 2025-01-13 PROCEDURE — 92133 CPTRZD OPH DX IMG PST SGM ON: CPT | Mod: PBBFAC,PO | Performed by: OPHTHALMOLOGY

## 2025-01-13 PROCEDURE — 92014 COMPRE OPH EXAM EST PT 1/>: CPT | Mod: S$PBB,,, | Performed by: OPHTHALMOLOGY

## 2025-01-13 NOTE — PROGRESS NOTES
HPI    DLS: 10/17/2023- annual OAG exam .    Pt c/o trouble driving at night. States all the lights are very bright /   glare.     States just started to notice change in vision     Denies pain.     Sees FOL and floaters OU. States hasn't seen FOL lately.     Gtts when needed.     Hemoglobin A1C       Date                     Value               Ref Range             Status                11/12/2024               5.6                 4.0 - 5.6 %           Final                  05/13/2024               6.8 (H)             4.0 - 5.6 %           Final                   09/14/2023               7.0 (H)             4.0 - 5.6 %           Final                Last edited by Eunice Zacarias on 1/13/2025  9:28 AM.            Assessment /Plan     For exam results, see Encounter Report.    Diabetes mellitus type 2 without retinopathy  -     Ambulatory referral/consult to Ophthalmology    OAG (open angle glaucoma) suspect, low risk, bilateral    Nuclear sclerotic cataract, bilateral      1. Diabetes mellitus type 2 without retinopathy (Primary)  Diabetes without retinopathy, discussed with patient importance of glucose control and follow up.  Patient voices understanding.    2. OAG (open angle glaucoma) suspect, low risk, bilateral  +famhx  Mildly thin pachy    ONH mildly suspicous OS>OD  OCT NFL normal and stable  HVF normal  IOP low normal    Very Low risk, follow off meds    F/u 1 year, routine exam with OCT NFL  Okay to follow with optometry    3. Nuclear sclerotic cataract, bilateral  Mild by exam  Mrx not changed much today, don't recommend   Recommend optometry for Mrx, pt may go to local optom    F/u 1 year, optometry

## 2025-01-16 DIAGNOSIS — K57.30 COLONIC DIVERTICULAR DISEASE: ICD-10-CM

## 2025-01-16 DIAGNOSIS — K57.30 DIVERTICULA OF INTESTINE: ICD-10-CM

## 2025-01-16 DIAGNOSIS — Z12.11 COLON CANCER SCREENING: Primary | ICD-10-CM

## 2025-01-16 RX ORDER — DICLOFENAC SODIUM 50 MG/1
50 TABLET, DELAYED RELEASE ORAL 2 TIMES DAILY
Qty: 60 TABLET | Refills: 2 | Status: SHIPPED | OUTPATIENT
Start: 2025-01-16

## 2025-01-16 NOTE — TELEPHONE ENCOUNTER
Spoke with patient    He had a Ct scan on 1/10/2025 in [facility]  This showed diverticulitis Urologist told him to contact PCP.   He said he is due for a colonoscopy.   Requesting referral   Please advise

## 2025-02-19 ENCOUNTER — PATIENT MESSAGE (OUTPATIENT)
Dept: FAMILY MEDICINE | Facility: CLINIC | Age: 75
End: 2025-02-19
Payer: MEDICARE

## 2025-02-19 ENCOUNTER — TELEPHONE (OUTPATIENT)
Dept: FAMILY MEDICINE | Facility: CLINIC | Age: 75
End: 2025-02-19
Payer: MEDICARE

## 2025-02-19 NOTE — TELEPHONE ENCOUNTER
See other note    gastro said he is not due for colonoscopy until 2028    He is requesting a referral to gastro for abdominal pain and cramping

## 2025-02-19 NOTE — TELEPHONE ENCOUNTER
Spoke with patient.   Dr Villegas sent colonoscopy orders on 1/16/2025   Patient has not been contacted for gastro dept.   Message sent

## 2025-02-19 NOTE — TELEPHONE ENCOUNTER
----- Message from Jan sent at 2/19/2025 12:26 PM CST -----  Type: Needs Medical AdviceWho Called:  Pt's wifeBest Call Back Number: [phone] Information: Pt;s wife calling about referral to a proctologist. Please call back to advise, Thanks!

## 2025-02-20 NOTE — TELEPHONE ENCOUNTER
Response:    Please inform patient I would recommend a visit to evaluate his concern for abdominal pain and cramping.   Presently he is not due for a colonoscopy until 2028.   Dr. Villegas

## 2025-02-24 DIAGNOSIS — Z00.00 ENCOUNTER FOR MEDICARE ANNUAL WELLNESS EXAM: ICD-10-CM

## 2025-02-26 ENCOUNTER — OFFICE VISIT (OUTPATIENT)
Dept: FAMILY MEDICINE | Facility: CLINIC | Age: 75
End: 2025-02-26
Payer: MEDICARE

## 2025-02-26 VITALS
DIASTOLIC BLOOD PRESSURE: 80 MMHG | HEART RATE: 91 BPM | TEMPERATURE: 98 F | BODY MASS INDEX: 32.8 KG/M2 | SYSTOLIC BLOOD PRESSURE: 124 MMHG | WEIGHT: 209 LBS | HEIGHT: 67 IN | OXYGEN SATURATION: 97 %

## 2025-02-26 DIAGNOSIS — K57.92 ACUTE DIVERTICULITIS: Primary | ICD-10-CM

## 2025-02-26 DIAGNOSIS — E11.69 TYPE 2 DIABETES MELLITUS WITH OTHER SPECIFIED COMPLICATION, WITHOUT LONG-TERM CURRENT USE OF INSULIN: ICD-10-CM

## 2025-02-26 DIAGNOSIS — R10.32 LEFT LOWER QUADRANT ABDOMINAL PAIN: ICD-10-CM

## 2025-02-26 PROCEDURE — 99215 OFFICE O/P EST HI 40 MIN: CPT | Mod: PBBFAC,PO

## 2025-02-26 PROCEDURE — 99999 PR PBB SHADOW E&M-EST. PATIENT-LVL V: CPT | Mod: PBBFAC,,,

## 2025-02-26 PROCEDURE — 99214 OFFICE O/P EST MOD 30 MIN: CPT | Mod: S$PBB,,,

## 2025-02-26 RX ORDER — AMOXICILLIN AND CLAVULANATE POTASSIUM 875; 125 MG/1; MG/1
1 TABLET, FILM COATED ORAL EVERY 12 HOURS
Qty: 20 TABLET | Refills: 0 | Status: SHIPPED | OUTPATIENT
Start: 2025-02-26 | End: 2025-03-08

## 2025-02-26 NOTE — PROGRESS NOTES
Subjective:       Patient ID: Cali Ho is a 74 y.o. male.    Chief Complaint: constipation and Abdominal Pain    Patient presents to the clinic with complaint of abdominal pain.     Patient Active Problem List:     Erectile dysfunction due to arterial insufficiency     BPH (benign prostatic hyperplasia)     Comprehensive diabetic foot examination, type 2 DM, encounter for     Chronic bilateral low back pain with bilateral sciatica     S/P insertion of penile implant     Severe obesity (BMI 35.0-39.9) with comorbidity     Hyperlipidemia     Ingrown nail     First degree AV block    Pain started to LLQ around December. He saw Urology and a CT scan was ordered and completed in January. It showed acute diverticulitis but he was never treated and was told to follow up with his PCP.         Abdominal Pain  This is a new problem. The current episode started more than 1 month ago. The onset quality is sudden. The problem occurs intermittently. The problem has been waxing and waning. The pain is located in the LLQ. The pain is moderate. The quality of the pain is sharp and aching. The abdominal pain does not radiate. Pertinent negatives include no constipation, diarrhea, dysuria, fever, frequency, nausea or vomiting. Nothing aggravates the pain. The pain is relieved by Nothing. He has tried nothing for the symptoms. The treatment provided no relief.     Review of Systems   Constitutional:  Negative for activity change, appetite change, chills, diaphoresis and fever.   HENT:  Negative for congestion, ear pain, postnasal drip, sinus pressure, sneezing and sore throat.    Eyes:  Negative for pain, discharge, redness and itching.   Respiratory:  Negative for apnea, cough, chest tightness, shortness of breath and wheezing.    Cardiovascular:  Negative for chest pain and leg swelling.   Gastrointestinal:  Positive for abdominal pain. Negative for abdominal distention, constipation, diarrhea, nausea and vomiting.    Genitourinary:  Negative for difficulty urinating, dysuria, flank pain and frequency.   Skin:  Negative for color change, rash and wound.   Neurological:  Negative for dizziness.   All other systems reviewed and are negative.      Problem List[1]    Objective:      Physical Exam  Vitals and nursing note reviewed.   Constitutional:       Appearance: Normal appearance. He is not ill-appearing.   HENT:      Head: Normocephalic and atraumatic.      Nose: Nose normal.   Eyes:      General: Lids are normal.   Cardiovascular:      Rate and Rhythm: Normal rate and regular rhythm.      Pulses: Normal pulses.      Heart sounds: Normal heart sounds.   Pulmonary:      Effort: Pulmonary effort is normal. No tachypnea or respiratory distress.      Breath sounds: Normal breath sounds. No wheezing.   Abdominal:      General: Bowel sounds are normal. There is no distension.      Palpations: Abdomen is soft.      Tenderness: There is no abdominal tenderness.   Musculoskeletal:         General: Normal range of motion.      Cervical back: Full passive range of motion without pain and normal range of motion.      Left lower leg: No edema.   Skin:     General: Skin is warm and dry.   Neurological:      Mental Status: He is alert and oriented to person, place, and time.   Psychiatric:         Mood and Affect: Mood normal.         Behavior: Behavior normal.         Lab Results   Component Value Date    WBC 5.63 11/12/2024    HGB 13.2 (L) 11/12/2024    HCT 40.2 11/12/2024     11/12/2024    CHOL 110 (L) 11/12/2024    TRIG 91 11/12/2024    HDL 35 (L) 11/12/2024    ALT 13 11/12/2024    AST 17 11/12/2024     11/12/2024    K 4.3 11/12/2024     11/12/2024    CREATININE 1.1 11/12/2024    BUN 22 01/10/2025    CO2 21 (L) 11/12/2024    TSH 0.786 05/13/2024    PSA 3.3 12/06/2022    HGBA1C 5.6 11/12/2024     The ASCVD Risk score (Marielos DK, et al., 2019) failed to calculate for the following reasons:    The valid total cholesterol  "range is 130 to 320 mg/dL  Visit Vitals  /80 (BP Location: Right arm, Patient Position: Sitting)   Pulse 91   Temp 98.2 °F (36.8 °C) (Oral)   Ht 5' 7" (1.702 m)   Wt 94.8 kg (208 lb 15.9 oz)   SpO2 97%   BMI 32.73 kg/m²      Assessment:       1. Acute diverticulitis    2. Left lower quadrant abdominal pain    3. Type 2 diabetes mellitus with other specified complication, without long-term current use of insulin        Plan:       1. Acute diverticulitis/Left lower quadrant abdominal pain  -     amoxicillin-clavulanate 875-125mg (AUGMENTIN) 875-125 mg per tablet; Take 1 tablet by mouth every 12 (twelve) hours. for 10 days  Dispense: 20 tablet; Refill: 0   - [facility] Diet   - Patient is to notify me if pain does not improve- will reorder CT scan and set up with GI    2. Type 2 diabetes mellitus with other specified complication, without long-term current use of insulin   - Stable-Continue Metformin   - Diabetic Diet   - Continue current plan of care       Follow up if symptoms worsen or fail to improve.      Future Appointments       Date Provider Specialty Appt Notes    5/19/2025  Lab labs    5/26/2025 Jame Villegas,  Family Medicine 6m f/u                  [1]   Patient Active Problem List  Diagnosis    Erectile dysfunction due to arterial insufficiency    BPH (benign prostatic hyperplasia)    Comprehensive diabetic foot examination, type 2 DM, encounter for    Chronic bilateral low back pain with bilateral sciatica    S/P insertion of penile implant    Severe obesity (BMI 35.0-39.9) with comorbidity    Hyperlipidemia    Ingrown nail    First degree AV block     "

## 2025-02-26 NOTE — PATIENT INSTRUCTIONS

## 2025-05-01 RX ORDER — DICLOFENAC SODIUM 50 MG/1
50 TABLET, DELAYED RELEASE ORAL 2 TIMES DAILY
Qty: 60 TABLET | Refills: 2 | Status: SHIPPED | OUTPATIENT
Start: 2025-05-01

## 2025-05-01 NOTE — TELEPHONE ENCOUNTER
Refill Routing Note   Medication(s) are not appropriate for processing by [facility] for the following reason(s):        Outside of protocol    [facility] action(s):  Route               Appointments  past 12m or future 3m with PCP    Date Provider   Last Visit   11/19/2024 Jame Villegas, DO   Next Visit   5/26/2025 Jame Villegas, DO   ED visits in past 90 days: 0        Note composed:9:40 AM 05/01/2025

## 2025-05-12 DIAGNOSIS — E11.9 TYPE 2 DIABETES MELLITUS WITHOUT COMPLICATION, WITHOUT LONG-TERM CURRENT USE OF INSULIN: ICD-10-CM

## 2025-05-12 NOTE — TELEPHONE ENCOUNTER
I spoke with pt via phone in regards to upcoming appt with Dr. Villegas. I have advised pt that Dr. Villegas is no longer with [facility]. Appt r/s to 05/27/2025 with Mrs. Kinga Hendrickson. No further questions. Time and date confirmed.

## 2025-05-13 ENCOUNTER — PATIENT MESSAGE (OUTPATIENT)
Dept: ADMINISTRATIVE | Facility: OTHER | Age: 75
End: 2025-05-13
Payer: MEDICARE

## 2025-05-13 RX ORDER — METFORMIN HYDROCHLORIDE 500 MG/1
1000 TABLET ORAL 2 TIMES DAILY WITH MEALS
Qty: 360 TABLET | Refills: 1 | Status: SHIPPED | OUTPATIENT
Start: 2025-05-13

## 2025-05-19 ENCOUNTER — LAB VISIT (OUTPATIENT)
Dept: LAB | Facility: HOSPITAL | Age: 75
End: 2025-05-19
Attending: STUDENT IN AN ORGANIZED HEALTH CARE EDUCATION/TRAINING PROGRAM
Payer: MEDICARE

## 2025-05-19 DIAGNOSIS — E78.2 MIXED HYPERLIPIDEMIA: ICD-10-CM

## 2025-05-19 DIAGNOSIS — E11.9 TYPE 2 DIABETES MELLITUS WITHOUT COMPLICATION, WITHOUT LONG-TERM CURRENT USE OF INSULIN: ICD-10-CM

## 2025-05-19 LAB
ALBUMIN SERPL BCP-MCNC: 3.6 G/DL (ref 3.5–5.2)
ALBUMIN/CREAT UR: NORMAL
ALP SERPL-CCNC: 95 UNIT/L (ref 40–150)
ALT SERPL W/O P-5'-P-CCNC: 17 UNIT/L (ref 10–44)
ANION GAP (OHS): 9 MMOL/L (ref 8–16)
AST SERPL-CCNC: 23 UNIT/L (ref 11–45)
BILIRUB SERPL-MCNC: 0.4 MG/DL (ref 0.1–1)
BUN SERPL-MCNC: 22 MG/DL (ref 8–23)
CALCIUM SERPL-MCNC: 9.3 MG/DL (ref 8.7–10.5)
CHLORIDE SERPL-SCNC: 108 MMOL/L (ref 95–110)
CHOLEST SERPL-MCNC: 138 MG/DL (ref 120–199)
CHOLEST/HDLC SERPL: 3.3 {RATIO} (ref 2–5)
CO2 SERPL-SCNC: 22 MMOL/L (ref 23–29)
CREAT SERPL-MCNC: 1.2 MG/DL (ref 0.5–1.4)
CREAT UR-MCNC: 63 MG/DL (ref 23–375)
EAG (OHS): 126 MG/DL (ref 68–131)
GFR SERPLBLD CREATININE-BSD FMLA CKD-EPI: >60 ML/MIN/1.73/M2
GLUCOSE SERPL-MCNC: 107 MG/DL (ref 70–110)
HBA1C MFR BLD: 6 % (ref 4–5.6)
HDLC SERPL-MCNC: 42 MG/DL (ref 40–75)
HDLC SERPL: 30.4 % (ref 20–50)
LDLC SERPL CALC-MCNC: 72.4 MG/DL (ref 63–159)
MICROALBUMIN UR-MCNC: <5 UG/ML (ref ?–5000)
NONHDLC SERPL-MCNC: 96 MG/DL
POTASSIUM SERPL-SCNC: 4.8 MMOL/L (ref 3.5–5.1)
PROT SERPL-MCNC: 6.5 GM/DL (ref 6–8.4)
SODIUM SERPL-SCNC: 139 MMOL/L (ref 136–145)
TRIGL SERPL-MCNC: 118 MG/DL (ref 30–150)

## 2025-05-19 PROCEDURE — 82043 UR ALBUMIN QUANTITATIVE: CPT

## 2025-05-19 PROCEDURE — 80053 COMPREHEN METABOLIC PANEL: CPT

## 2025-05-19 PROCEDURE — 36415 COLL VENOUS BLD VENIPUNCTURE: CPT | Mod: PO

## 2025-05-19 PROCEDURE — 80061 LIPID PANEL: CPT

## 2025-05-19 PROCEDURE — 83036 HEMOGLOBIN GLYCOSYLATED A1C: CPT

## 2025-05-20 ENCOUNTER — RESULTS FOLLOW-UP (OUTPATIENT)
Dept: FAMILY MEDICINE | Facility: CLINIC | Age: 75
End: 2025-05-20

## 2025-05-27 ENCOUNTER — OFFICE VISIT (OUTPATIENT)
Dept: FAMILY MEDICINE | Facility: CLINIC | Age: 75
End: 2025-05-27
Payer: MEDICARE

## 2025-05-27 VITALS
RESPIRATION RATE: 18 BRPM | DIASTOLIC BLOOD PRESSURE: 62 MMHG | WEIGHT: 213.19 LBS | HEART RATE: 65 BPM | OXYGEN SATURATION: 97 % | BODY MASS INDEX: 33.46 KG/M2 | HEIGHT: 67 IN | SYSTOLIC BLOOD PRESSURE: 130 MMHG

## 2025-05-27 DIAGNOSIS — E78.2 MIXED HYPERLIPIDEMIA: Primary | ICD-10-CM

## 2025-05-27 DIAGNOSIS — K21.9 GASTROESOPHAGEAL REFLUX DISEASE, UNSPECIFIED WHETHER ESOPHAGITIS PRESENT: ICD-10-CM

## 2025-05-27 DIAGNOSIS — N40.0 BENIGN PROSTATIC HYPERPLASIA, UNSPECIFIED WHETHER LOWER URINARY TRACT SYMPTOMS PRESENT: ICD-10-CM

## 2025-05-27 DIAGNOSIS — E11.9 TYPE 2 DIABETES MELLITUS WITHOUT COMPLICATION, WITHOUT LONG-TERM CURRENT USE OF INSULIN: ICD-10-CM

## 2025-05-27 DIAGNOSIS — R97.20 ELEVATED PSA MEASUREMENT: ICD-10-CM

## 2025-05-27 PROCEDURE — 99214 OFFICE O/P EST MOD 30 MIN: CPT | Mod: PBBFAC,PO

## 2025-05-27 PROCEDURE — 99999 PR PBB SHADOW E&M-EST. PATIENT-LVL IV: CPT | Mod: PBBFAC,,,

## 2025-05-27 PROCEDURE — 99214 OFFICE O/P EST MOD 30 MIN: CPT | Mod: S$PBB,,,

## 2025-05-27 RX ORDER — ESOMEPRAZOLE MAGNESIUM 40 MG/1
40 CAPSULE, DELAYED RELEASE ORAL
Qty: 30 CAPSULE | Refills: 11 | Status: SHIPPED | OUTPATIENT
Start: 2025-05-27 | End: 2026-05-27

## 2025-05-27 RX ORDER — ATORVASTATIN CALCIUM 10 MG/1
10 TABLET, FILM COATED ORAL NIGHTLY
Qty: 90 TABLET | Refills: 3 | Status: SHIPPED | OUTPATIENT
Start: 2025-05-27

## 2025-05-27 NOTE — PROGRESS NOTES
To Subjective:       Patient ID: Cali Ho is a 75 y.o. male.    Chief Complaint: 6 month follow up    Patient presents to the clinic for a 6 month follow up.     Patient Active Problem List:     Erectile dysfunction due to arterial insufficiency     BPH (benign prostatic hyperplasia)     Comprehensive diabetic foot examination, type 2 DM, encounter for     Chronic bilateral low back pain with bilateral sciatica     S/P insertion of penile implant     Severe obesity (BMI 35.0-39.9) with comorbidity     Hyperlipidemia     Ingrown nail     First degree AV block    Followed by:  Urology- Dr. Lang in [place], [place]    DM-  Lab Results       Component                Value               Date                       HGBA1C                   6.0 (H)             05/19/2025            Taking Metformin as prescribed.     Hyperlipidemia-  Lab Results       Component                Value               Date                       CHOL                     138                 05/19/2025                 CHOL                     110 (L)             11/12/2024                 CHOL                     134                 05/13/2024             Lab Results       Component                Value               Date                       HDL                      42                  05/19/2025                 HDL                      35 (L)              11/12/2024                 HDL                      36 (L)              05/13/2024            Lab Results       Component                Value               Date                       LDLCALC                  72.4                05/19/2025                 LDLCALC                  56.8 (L)            11/12/2024                 LDLCALC                  72.6                05/13/2024             Lab Results       Component                Value               Date                       TRIG                     118                 05/19/2025                 TRIG                     91                   11/12/2024                 TRIG                     127                 05/13/2024            Lab Results       Component                Value               Date                       TOTALCHOLEST             3.3                 05/19/2025                 TOTALCHOLEST             3.1                 11/12/2024                 TOTALCHOLEST             3.7                 05/13/2024            Lab Results       Component                Value               Date                       NONHDLCHOL               96                  05/19/2025                 NONHDLCHOL               75                  11/12/2024                 NONHDLCHOL               98                  05/13/2024            Lab Results       Component                Value               Date                       CHOLHDL                  30.4                05/19/2025                 CHOLHDL                  31.8                11/12/2024                 CHOLHDL                  26.9                05/13/2024             Taking Lipitor as prescribed.     Has upcoming prostate biopsy June 13th in [place], [place].     Has no new complaints or concerns today.     Patient educated on plan of care, verbalized understanding.         Review of Systems   Constitutional:  Negative for activity change, appetite change, chills, diaphoresis and fever.   HENT:  Negative for congestion, ear pain, postnasal drip, sinus pressure, sneezing and sore throat.    Eyes:  Negative for pain, discharge, redness and itching.   Respiratory:  Negative for apnea, cough, chest tightness, shortness of breath and wheezing.    Cardiovascular:  Negative for chest pain and leg swelling.   Gastrointestinal:  Negative for abdominal distention, abdominal pain, constipation, diarrhea, nausea and vomiting.   Genitourinary:  Negative for difficulty urinating, dysuria, flank pain and frequency.   Skin:  Negative for color change, rash and wound.   Neurological:  Negative for dizziness.   All  other systems reviewed and are negative.      Problem List[1]    Objective:      Physical Exam  Vitals and nursing note reviewed.   Constitutional:       Appearance: Normal appearance. He is not ill-appearing.   HENT:      Head: Normocephalic and atraumatic.      Nose: Nose normal.   Eyes:      General: Lids are normal.   Cardiovascular:      Rate and Rhythm: Normal rate and regular rhythm.      Pulses: Normal pulses.      Heart sounds: Normal heart sounds.   Pulmonary:      Effort: Pulmonary effort is normal. No tachypnea or respiratory distress.      Breath sounds: Normal breath sounds. No wheezing.   Abdominal:      General: Bowel sounds are normal. There is no distension.      Palpations: Abdomen is soft.      Tenderness: There is no abdominal tenderness.   Musculoskeletal:         General: Normal range of motion.      Cervical back: Full passive range of motion without pain and normal range of motion.      Left lower leg: No edema.   Skin:     General: Skin is warm and dry.   Neurological:      Mental Status: He is alert and oriented to person, place, and time.   Psychiatric:         Mood and Affect: Mood normal.         Behavior: Behavior normal.         Lab Results   Component Value Date    WBC 5.63 11/12/2024    HGB 13.2 (L) 11/12/2024    HCT 40.2 11/12/2024     11/12/2024    CHOL 138 05/19/2025    TRIG 118 05/19/2025    HDL 42 05/19/2025    ALT 17 05/19/2025    AST 23 05/19/2025     05/19/2025    K 4.8 05/19/2025     05/19/2025    CREATININE 1.2 05/19/2025    BUN 22 05/19/2025    CO2 22 (L) 05/19/2025    TSH 0.786 05/13/2024    PSA 3.3 12/06/2022    HGBA1C 6.0 (H) 05/19/2025     The 10-year ASCVD risk score (Marielos FOREMAN, et al., 2019) is: 38.6%    Values used to calculate the score:      Age: 75 years      Sex: Male      Is Non- : No      Diabetic: Yes      Tobacco smoker: No      Systolic Blood Pressure: 130 mmHg      Is BP treated: No      HDL Cholesterol: 42  "mg/dL      Total Cholesterol: 138 mg/dL  Visit Vitals  /62 (BP Location: Right arm, Patient Position: Sitting)   Pulse 65   Resp 18   Ht 5' 7" (1.702 m)   Wt 96.7 kg (213 lb 3 oz)   SpO2 97%   BMI 33.39 kg/m²      Assessment:       1. Mixed hyperlipidemia    2. Type 2 diabetes mellitus without complication, without long-term current use of insulin    3. Gastroesophageal reflux disease, unspecified whether esophagitis present    4. Benign prostatic hyperplasia, unspecified whether lower urinary tract symptoms present    5. Elevated PSA measurement        Plan:       1. Mixed hyperlipidemia  -     atorvastatin (LIPITOR) 10 MG tablet; Take 1 tablet (10 mg total) by mouth every evening. For Cholesterol  Dispense: 90 tablet; Refill: 3   - Stable-Continue Lipitor   - Low fat, low cholesterol diet   - Continue current plan of care    2. Type 2 diabetes mellitus without complication, without long-term current use of insulin   - Stable-Continue Metformin   - Diabetic Diet   - Continue current plan of care    3. Gastroesophageal reflux disease, unspecified whether esophagitis present  -     esomeprazole (NEXIUM) 40 MG capsule; Take 1 capsule (40 mg total) by mouth before breakfast.  Dispense: 30 capsule; Refill: 11   - Stable-Continue Nexium   - Continue current plan of care    4. Benign prostatic hyperplasia, unspecified whether lower urinary tract symptoms present/Elevated PSA measurement   - Following with Urology   - Has upcoming prostate biopsy       Follow up in about 6 months (around 11/27/2025), or if symptoms worsen or fail to improve.                  [1]   Patient Active Problem List  Diagnosis    Erectile dysfunction due to arterial insufficiency    BPH (benign prostatic hyperplasia)    Comprehensive diabetic foot examination, type 2 DM, encounter for    Chronic bilateral low back pain with bilateral sciatica    S/P insertion of penile implant    Severe obesity (BMI 35.0-39.9) with comorbidity    " Hyperlipidemia    Ingrown nail    First degree AV block

## 2025-08-22 ENCOUNTER — OFFICE VISIT (OUTPATIENT)
Dept: FAMILY MEDICINE | Facility: CLINIC | Age: 75
End: 2025-08-22
Payer: MEDICARE

## 2025-08-22 ENCOUNTER — LAB VISIT (OUTPATIENT)
Dept: LAB | Facility: HOSPITAL | Age: 75
End: 2025-08-22
Attending: FAMILY MEDICINE
Payer: MEDICARE

## 2025-08-22 VITALS
SYSTOLIC BLOOD PRESSURE: 128 MMHG | HEIGHT: 67 IN | DIASTOLIC BLOOD PRESSURE: 78 MMHG | TEMPERATURE: 98 F | OXYGEN SATURATION: 97 % | WEIGHT: 202.63 LBS | BODY MASS INDEX: 31.8 KG/M2 | HEART RATE: 62 BPM

## 2025-08-22 DIAGNOSIS — R07.9 CHEST PAIN, UNSPECIFIED TYPE: ICD-10-CM

## 2025-08-22 DIAGNOSIS — C61 PROSTATE CANCER: ICD-10-CM

## 2025-08-22 DIAGNOSIS — Z96.0 S/P INSERTION OF PENILE IMPLANT: ICD-10-CM

## 2025-08-22 DIAGNOSIS — E78.5 HYPERLIPIDEMIA, UNSPECIFIED HYPERLIPIDEMIA TYPE: ICD-10-CM

## 2025-08-22 DIAGNOSIS — Z00.00 PHYSICAL EXAM: Primary | ICD-10-CM

## 2025-08-22 DIAGNOSIS — H91.90 HEARING LOSS, UNSPECIFIED HEARING LOSS TYPE, UNSPECIFIED LATERALITY: ICD-10-CM

## 2025-08-22 DIAGNOSIS — I35.1 MILD AORTIC REGURGITATION: ICD-10-CM

## 2025-08-22 DIAGNOSIS — E11.9 TYPE 2 DIABETES MELLITUS WITHOUT COMPLICATION, WITHOUT LONG-TERM CURRENT USE OF INSULIN: ICD-10-CM

## 2025-08-22 DIAGNOSIS — Z00.00 PHYSICAL EXAM: ICD-10-CM

## 2025-08-22 PROBLEM — N40.0 BPH (BENIGN PROSTATIC HYPERPLASIA): Status: RESOLVED | Noted: 2017-03-14 | Resolved: 2025-08-22

## 2025-08-22 PROBLEM — E66.01 SEVERE OBESITY (BMI 35.0-39.9) WITH COMORBIDITY: Status: RESOLVED | Noted: 2022-12-12 | Resolved: 2025-08-22

## 2025-08-22 LAB
ABSOLUTE EOSINOPHIL (SMH): 0.05 K/UL
ABSOLUTE MONOCYTE (SMH): 0.48 K/UL (ref 0.3–1)
ABSOLUTE NEUTROPHIL COUNT (SMH): 3.6 K/UL (ref 1.8–7.7)
ALBUMIN SERPL-MCNC: 4.4 G/DL (ref 3.5–5.2)
ALP SERPL-CCNC: 68 UNIT/L (ref 55–135)
ALT SERPL-CCNC: 11 UNIT/L (ref 10–44)
ANION GAP (SMH): 7 MMOL/L (ref 8–16)
AST SERPL-CCNC: 15 UNIT/L (ref 10–40)
BASOPHILS # BLD AUTO: 0.02 K/UL
BASOPHILS NFR BLD AUTO: 0.3 %
BILIRUB SERPL-MCNC: 0.7 MG/DL (ref 0.1–1)
BUN SERPL-MCNC: 27 MG/DL (ref 8–23)
CALCIUM SERPL-MCNC: 9.5 MG/DL (ref 8.7–10.5)
CHLORIDE SERPL-SCNC: 107 MMOL/L (ref 95–110)
CHOLEST SERPL-MCNC: 124 MG/DL (ref 120–199)
CHOLEST/HDLC SERPL: 2.8 {RATIO} (ref 2–5)
CO2 SERPL-SCNC: 26 MMOL/L (ref 23–29)
CREAT SERPL-MCNC: 1.1 MG/DL (ref 0.5–1.4)
EAG (SMH): 128 MG/DL (ref 68–131)
ERYTHROCYTE [DISTWIDTH] IN BLOOD BY AUTOMATED COUNT: 14.2 % (ref 11.5–14.5)
GFR SERPLBLD CREATININE-BSD FMLA CKD-EPI: >60 ML/MIN/1.73/M2
GLUCOSE SERPL-MCNC: 101 MG/DL (ref 70–110)
HBA1C MFR BLD: 6.1 % (ref 4.5–6.2)
HCT VFR BLD AUTO: 41.7 % (ref 40–54)
HDLC SERPL-MCNC: 44 MG/DL (ref 40–75)
HDLC SERPL: 35.5 % (ref 20–50)
HGB BLD-MCNC: 14.2 GM/DL (ref 14–18)
IMM GRANULOCYTES # BLD AUTO: 0.01 K/UL (ref 0–0.04)
IMM GRANULOCYTES NFR BLD AUTO: 0.2 % (ref 0–0.5)
LDLC SERPL CALC-MCNC: 59.2 MG/DL (ref 63–159)
LYMPHOCYTES # BLD AUTO: 2.08 K/UL (ref 1–4.8)
MCH RBC QN AUTO: 30.7 PG (ref 27–31)
MCHC RBC AUTO-ENTMCNC: 34.1 G/DL (ref 32–36)
MCV RBC AUTO: 90 FL (ref 82–98)
NONHDLC SERPL-MCNC: 80 MG/DL
NUCLEATED RBC (/100WBC) (SMH): 0 /100 WBC
PLATELET # BLD AUTO: 185 K/UL (ref 150–450)
PMV BLD AUTO: 9.5 FL (ref 9.2–12.9)
POTASSIUM SERPL-SCNC: 4.3 MMOL/L (ref 3.5–5.1)
PROT SERPL-MCNC: 6.6 GM/DL (ref 6–8.4)
RBC # BLD AUTO: 4.63 M/UL (ref 4.6–6.2)
RELATIVE EOSINOPHIL (SMH): 0.8 % (ref 0–8)
RELATIVE LYMPHOCYTE (SMH): 33.3 % (ref 18–48)
RELATIVE MONOCYTE (SMH): 7.7 % (ref 4–15)
RELATIVE NEUTROPHIL (SMH): 57.7 % (ref 38–73)
SODIUM SERPL-SCNC: 140 MMOL/L (ref 136–145)
TRIGL SERPL-MCNC: 104 MG/DL (ref 30–150)
WBC # BLD AUTO: 6.24 K/UL (ref 3.9–12.7)

## 2025-08-22 PROCEDURE — 83036 HEMOGLOBIN GLYCOSYLATED A1C: CPT

## 2025-08-22 PROCEDURE — 99215 OFFICE O/P EST HI 40 MIN: CPT | Mod: PBBFAC,PN | Performed by: FAMILY MEDICINE

## 2025-08-22 PROCEDURE — 85025 COMPLETE CBC W/AUTO DIFF WBC: CPT

## 2025-08-22 PROCEDURE — 80061 LIPID PANEL: CPT

## 2025-08-22 PROCEDURE — 99999 PR PBB SHADOW E&M-EST. PATIENT-LVL V: CPT | Mod: PBBFAC,,, | Performed by: FAMILY MEDICINE

## 2025-08-22 PROCEDURE — 36415 COLL VENOUS BLD VENIPUNCTURE: CPT

## 2025-08-22 PROCEDURE — 82040 ASSAY OF SERUM ALBUMIN: CPT

## 2025-08-22 RX ORDER — MULTIVIT WITH MINERALS/HERBS
1 TABLET ORAL DAILY
COMMUNITY

## 2025-08-25 ENCOUNTER — TELEPHONE (OUTPATIENT)
Dept: FAMILY MEDICINE | Facility: CLINIC | Age: 75
End: 2025-08-25
Payer: MEDICARE

## 2025-08-25 PROBLEM — E11.9 TYPE 2 DIABETES MELLITUS WITHOUT COMPLICATION, WITHOUT LONG-TERM CURRENT USE OF INSULIN: Status: RESOLVED | Noted: 2021-07-12 | Resolved: 2025-08-25
